# Patient Record
Sex: FEMALE | Race: BLACK OR AFRICAN AMERICAN | NOT HISPANIC OR LATINO | Employment: FULL TIME | URBAN - METROPOLITAN AREA
[De-identification: names, ages, dates, MRNs, and addresses within clinical notes are randomized per-mention and may not be internally consistent; named-entity substitution may affect disease eponyms.]

---

## 2017-01-03 ENCOUNTER — RADIANT APPOINTMENT (OUTPATIENT)
Dept: ULTRASOUND IMAGING | Facility: CLINIC | Age: 42
End: 2017-01-03
Attending: NURSE PRACTITIONER
Payer: COMMERCIAL

## 2017-01-03 ENCOUNTER — OFFICE VISIT (OUTPATIENT)
Dept: OBGYN | Facility: CLINIC | Age: 42
End: 2017-01-03
Payer: COMMERCIAL

## 2017-01-03 VITALS
WEIGHT: 161.4 LBS | SYSTOLIC BLOOD PRESSURE: 142 MMHG | HEART RATE: 78 BPM | BODY MASS INDEX: 27.55 KG/M2 | TEMPERATURE: 99.1 F | HEIGHT: 64 IN | DIASTOLIC BLOOD PRESSURE: 89 MMHG

## 2017-01-03 DIAGNOSIS — R10.2 PELVIC PAIN IN FEMALE: ICD-10-CM

## 2017-01-03 DIAGNOSIS — D25.9 UTERINE LEIOMYOMA, UNSPECIFIED LOCATION: Primary | ICD-10-CM

## 2017-01-03 DIAGNOSIS — D25.9 UTERINE LEIOMYOMA, UNSPECIFIED LOCATION: ICD-10-CM

## 2017-01-03 PROCEDURE — 76856 US EXAM PELVIC COMPLETE: CPT

## 2017-01-03 PROCEDURE — 99214 OFFICE O/P EST MOD 30 MIN: CPT | Performed by: NURSE PRACTITIONER

## 2017-01-03 ASSESSMENT — PAIN SCALES - GENERAL: PAINLEVEL: MODERATE PAIN (4)

## 2017-01-03 NOTE — PROGRESS NOTES
S: Cyndy is a 41 year old  3 para 2 presenting today with concerns of right sided pelvic pain. Symptoms began approximately 5 days ago. Patient does have a history of multiple uterine fibroids, the largest being located on her right side and is not quite 6 cm. This was discovered approximately 6 months ago. Dr. Agbeh placed her on Lupron for management. Patient did one injection, decided not to do the second due to side effects that were undesirable. Pain comes and goes and can go down her right leg. Describes as cramping and like contractions. NSAIDS and raspberry leaf tea both seem to help. No aggravating factors. Symptoms also improve with rest. No bowel changes, appetite normal. Denies abnormal urinary symptoms, vaginal symptoms, fevers, nausea, dizziness. Patient not sure if she is done with childbearing yet, so not interested in hysterectomy. Patient medical, surgical, social, and family history reviewed and updated at today's visit. ROS: 10 point ROS neg other than the symptoms noted above in the HPI.    O: This is a well appearing female in no acute distress. Answers questions and maintains eye contact appropriately. Vital signs noted.  CV: Regular rate and rhythm without murmur, gallop, rub  RESPIRATORY: Clear to auscultation bilaterally.  ABDOMEN: Soft, nondistended, normoactive bowel sounds. No hepatosplenomegaly. No guarding, rebounding, or rigidity. Tender to palpation across lower quadrants bilaterally. Fundal height approximately 16 cm.  Vulva: No external lesions, normal hair distribution, no adenopathy  BUS:  Normal, no masses noted  Vagina: Moist, pink, no abnormal discharge, well rugated, no lesions  Cervix: Pink, parous, midline. Without cervical motion tenderness.  Uterus: Enlarged to approximately 16 weeks gestation, tender, mobile  Ovaries: Not palpable.    A/P:  (D25.9) Uterine leiomyoma, unspecified location  (primary encounter diagnosis)  Comment: Discussed patient's history of  multiple uterine fibroids, enlarged uterus. Symptoms may be related to this, but cannot rule out other pathology such as ovarian cyst. Plan pelvic ultrasound as her last was approximately 6 months ago and did one dose of Lupron afterwards. For now, continue NSAIDs. Use heat PRN. Warning signs to monitor for and report immediately discussed with patient and she verbalizes understanding. We did discuss management options of her uterine fibroids. Does not want hysterectomy and is unsure about future childbearing. We discussed myomectomy, UAE, repeat Lupron, hormonal contraception. Will follow up with patient once results available. Patient is given an opportunity to ask questions and have them answered.  Plan: US Pelvic Complete with Transvaginal         (R10.2) Pelvic pain in female  Comment: Per above  Plan: US Pelvic Complete with Transvaginal  Approximately 30 minutes face to face time was spent with the patient today with greater than 50% spent in education and counseling regarding fibroid history and management.         Jaye ALATORRE CNP

## 2017-01-03 NOTE — MR AVS SNAPSHOT
"              After Visit Summary   1/3/2017    Cyndy Alfred    MRN: [id number]           Patient Information     Date Of Birth          1975        Visit Information        Provider Department      1/3/2017 8:30 AM Jaye Botello APRN CNP [facility]        Today's Diagnoses     Uterine leiomyoma, unspecified location    -  1     Pelvic pain in female            Follow-ups after your visit        Future tests that were ordered for you today     Open Future Orders        Priority Expected Expires Ordered    US Pelvic Complete with Transvaginal Routine  4/3/2017 1/3/2017            Who to contact     If you have questions or need follow up information about today's clinic visit or your schedule please contact [facility] directly at [phone].  Normal or non-critical lab and imaging results will be communicated to you by Walltikhart, letter or phone within 4 business days after the clinic has received the results. If you do not hear from us within 7 days, please contact the clinic through Walltikhart or phone. If you have a critical or abnormal lab result, we will notify you by phone as soon as possible.  Submit refill requests through [employer] or call your pharmacy and they will forward the refill request to us. Please allow 3 business days for your refill to be completed.          Additional Information About Your Visit        Wall[employer] Information     [employer] lets you send messages to your doctor, view your test results, renew your prescriptions, schedule appointments and more. To sign up, go to [email or web address] . Click on \"Log in\" on the left side of the screen, which will take you to the Welcome page. Then click on \"Sign up Now\" on the right side of the page.     You will be asked to enter the access code listed below, as well as some personal information. Please follow the directions to create your username and password.     Your access code is: [email or web address]  Expires: " "3/30/2017  1:43 PM     Your access code will  in 90 days. If you need help or a new code, please call your [place] clinic or [phone].        Your Vitals Were     Pulse Temperature Height BMI (Body Mass Index) Last Period       78 99.1  F (37.3  C) (Oral) 5' 4\" (1.626 m) 27.69 kg/m2 2016 (Exact Date)        Blood Pressure from Last 3 Encounters:   17 142/89   16 136/90   07/21/15 143/81    Weight from Last 3 Encounters:   17 161 lb 6.4 oz (73.211 kg)   16 165 lb 6.4 oz (75.025 kg)   07/21/15 156 lb (70.761 kg)                 Today's Medication Changes          These changes are accurate as of: 1/3/17  9:05 AM.  If you have any questions, ask your nurse or doctor.               Stop taking these medicines if you haven't already. Please contact your care team if you have questions.     leuprolide 11.25 MG kit   Commonly known as:  LUPRON DEPOT   Stopped by:  Jaye Botello APRN CNP                    Primary Care Provider Office Phone # Fax #    Xgpniz Mawuena Agbeh, [phone]       [facility] [address] [facility] [address]        Thank you!     [facility] you [facility]. Our goal is always to provide you with excellent care. Hearing back from our patients is one way we can continue to improve our services. Please take a few minutes to complete the written survey that you may receive in the mail after your visit with us. Thank you!             Your Updated Medication List - Protect others around you: Learn how to safely use, store and throw away your medicines at www.disposemymeds.org.          This list is accurate as of: 1/3/17  9:05 AM.  Always use your most recent med list.                   Brand Name Dispense Instructions for use    * MIDOL CRAMP FORMULA MAX ST PO      Take by mouth every 4 hours as needed for moderate pain       * ibuprofen 600 MG tablet    ADVIL/MOTRIN    28 tablet "    Take 1 tablet (600 mg) by mouth every 6 hours as needed for moderate pain       * UNABLE TO FIND      daily MEDICATION NAME: Green Tea Supplement       * UNABLE TO FIND      daily MEDICATION NAME: Tumeric supplement       * Notice:  This list has 4 medication(s) that are the same as other medications prescribed for you. Read the directions carefully, and ask your doctor or other care provider to review them with you.

## 2017-01-03 NOTE — NURSING NOTE
"Chief Complaint   Patient presents with     Pelvic Pain     RLQ pain x 5 days       Initial /89 mmHg  Pulse 78  Temp(Src) 99.1  F (37.3  C) (Oral)  Ht 5' 4\" (1.626 m)  Wt 161 lb 6.4 oz (73.211 kg)  BMI 27.69 kg/m2  LMP 12/16/2016 (Exact Date) Estimated body mass index is 27.69 kg/(m^2) as calculated from the following:    Height as of this encounter: 5' 4\" (1.626 m).    Weight as of this encounter: 161 lb 6.4 oz (73.211 kg)..  BP completed using cuff size: calos Potts CMA      "

## 2017-01-04 ENCOUNTER — TELEPHONE (OUTPATIENT)
Dept: OBGYN | Facility: CLINIC | Age: 42
End: 2017-01-04

## 2017-01-04 NOTE — TELEPHONE ENCOUNTER
Telephone call to patient and discussed ultrasound results. Uterus still enlarged, multiple fibroids still present. Also, discussed the right ovarian cyst and management. Symptoms have improved a little since it began. At this time, she wants to see if the symptoms improve over the next few weeks. If it does not or if it worsens, will follow up. She is also considering surgical intervention for her fibroids-would want to wait for Dr. Agbeh to be back in clinic before doing consult. Warning signs to monitor for and report immediately discussed with patient and she verbalizes understanding. Questions answered. Jaye ALATORRE CNP

## 2017-09-26 ENCOUNTER — OFFICE VISIT (OUTPATIENT)
Dept: OBGYN | Facility: CLINIC | Age: 42
End: 2017-09-26
Payer: COMMERCIAL

## 2017-09-26 ENCOUNTER — RADIANT APPOINTMENT (OUTPATIENT)
Dept: MRI IMAGING | Facility: CLINIC | Age: 42
End: 2017-09-26
Attending: OBSTETRICS & GYNECOLOGY
Payer: COMMERCIAL

## 2017-09-26 VITALS
OXYGEN SATURATION: 100 % | TEMPERATURE: 98 F | SYSTOLIC BLOOD PRESSURE: 119 MMHG | DIASTOLIC BLOOD PRESSURE: 78 MMHG | WEIGHT: 154.3 LBS | HEART RATE: 80 BPM | BODY MASS INDEX: 26.49 KG/M2

## 2017-09-26 DIAGNOSIS — D25.1 INTRAMURAL LEIOMYOMA OF UTERUS: ICD-10-CM

## 2017-09-26 DIAGNOSIS — R10.2 PELVIC PAIN IN FEMALE: Primary | ICD-10-CM

## 2017-09-26 DIAGNOSIS — R10.2 PELVIC PAIN IN FEMALE: ICD-10-CM

## 2017-09-26 PROCEDURE — 99214 OFFICE O/P EST MOD 30 MIN: CPT | Performed by: OBSTETRICS & GYNECOLOGY

## 2017-09-26 PROCEDURE — 72197 MRI PELVIS W/O & W/DYE: CPT | Performed by: RADIOLOGY

## 2017-09-26 PROCEDURE — A9585 GADOBUTROL INJECTION: HCPCS | Mod: JW | Performed by: RADIOLOGY

## 2017-09-26 RX ORDER — GADOBUTROL 604.72 MG/ML
7 INJECTION INTRAVENOUS ONCE
Status: COMPLETED | OUTPATIENT
Start: 2017-09-26 | End: 2017-09-26

## 2017-09-26 RX ADMIN — GADOBUTROL 7 ML: 604.72 INJECTION INTRAVENOUS at 17:02

## 2017-09-26 NOTE — PROGRESS NOTES
Cyndy is a 42 year old  referred here by self for consultation regarding symptomatic uterine fibroids.  She is complaining of worsening pelvic pain, back pains and heavier menses.  She has been treated in the past with OCP and lupron x one cycle. Quit lupron due to side effects.  She is wants treatment that preserves her uterus for possible future pregnancy.Not actively trying to get pregnant now  Requesting blood work.  She was in UC in 2017 with the [facility] ultrasound:  Results for orders placed or performed in visit on 17   US Pelvis Complete without Transvaginal    Narrative    PELVIC ULTRASOUND TRANSABDOMINAL IMAGING  January 3, 2017 10:23 AM    HISTORY: Pelvic pain and uterine leiomyoma.    COMPARISON: An ultrasound on 2015.    TECHNIQUE: Transabdominal imaging was performed.     FINDING: The uterus measures 16.1 x 9.7 x 10.9 cm. Again seen are  numerous diffuse uterine myomas, with at least 10 identifiable. The  largest five are as follows:  1. In the right aspect of the body is a 5.7 x 4.5 x 4.4 cm intramural  myoma, previously measuring 5.7 x 4.8 x 4.1 cm.  2. In the posterior aspect of the lower uterine body, there is a 5.5 x  4.5 x 5.1 cm intramural myoma, previously measuring 5.1 x 5.7 x 4.5  cm.  3. In the anterior aspect of the mid body is a 3.0 x 2.7 x 2.7 cm  intramural myoma, previously measuring 3.2 x 3.1 x 2.9 cm.  4. In the anterior aspect of the uterine fundus is a 3.8 x 1.9 x 2.5  cm intramural myoma. Previously this measured 3.1 x 3.2 x 2.4 cm.  5. In the right aspect of the lower uterine segment is a 4.2 x 3.3 x  4.4 cm intramural myoma. This was not measured previously.    There are several additional smaller myomas which were not measured.    The endometrium is normal measuring 0.6 cm. The right ovary contains a  2.5 x 1.2 x 1.7 cm somewhat complex area with no internal blood flow.  This may represent a complex cyst. The left ovary is normal. Normal  blood flow is seen in  both ovaries. No adnexal pathology is seen.  There is no free fluid in the cul-de-sac.      Impression    IMPRESSION:   1. Multiple uterine myomas as described above.  2. There is a 2.5 cm complex area of the right ovary. This most likely  represents a complex cyst. This can be followed in one to two months  by ultrasound.    MARIS GILLESPIE MD         ROS: Ten point review of systems was reviewed and negative except the above.    Gyne: - abn pap (last pap ), - STD's    Past Medical History:   Diagnosis Date     Headache(784.0)      History of cardiac murmur childhood     PIH (Pregnancy Induced Hypertension), Previous Postpartum Condition      Uterine fibroid      Past Surgical History:   Procedure Laterality Date     C/SECTION, LOW TRANSVERSE      x2     Patient Active Problem List   Diagnosis     CARDIOVASCULAR SCREENING; LDL GOAL LESS THAN 160     Abnormal TSH     Pelvic pain in female       ALL/Meds: Her medication and allergy histories were reviewed and are documented in their appropriate chart areas.    SH: - tob, - EtOH,     FH: Her family history was reviewed and documented in its appropriate chart area.    PE: /78  Pulse 80  Temp 98  F (36.7  C) (Oral)  Wt 70 kg (154 lb 4.8 oz)  LMP 09/20/2017  SpO2 100%  BMI 26.49 kg/m2  Body mass index is 26.49 kg/(m^2).      General:  WNWD female, NAD  Alert  Oriented x 3  Gait:  Normal  Skin:  Normal skin turgor  HEENT:  NC/AT, EOMI  Abdomen: Uterus fundus to umbilicus..  Pelvic exam:  Not performed  Extremities:  No clubbing, no cyanosis and no edema.      A/P    ICD-10-CM    1. Pelvic pain in female R10.2 MR Pelvis w/o & w Contrast     CBC with platelets differential     Comprehensive metabolic panel     Lipid panel reflex to direct LDL     TSH with free T4 reflex     Hemoglobin A1c   2. Intramural leiomyoma of uterus D25.1 MR Pelvis w/o & w Contrast     CBC with platelets differential     Comprehensive metabolic panel     Lipid panel reflex to direct  LDL     TSH with free T4 reflex     Hemoglobin A1c       I discussed fibroids.  We discussed their origin, the fact that while they are benign, they do tend to grow slowly in response to estrogen.  We discussed the normal resolution that gradually occurs after menopause.  I explained that fibroids are very common and in many cases do not cause symptoms.  We discussed these symptoms, including menorrhagia, metrorrhagia, dysmenorrhea as well as the mass effect that fibroids can cause.  We discussed options for treatment.  These include conservative observation, symptomatic hormonal control, myomectomy, uterine artery embolization, and hysterectomy as a final resort..  We discussed the RISKS, BENEFITS, AND ALTERNATIVES of each of these options.  This includes the risk of post-procedure pain, passage of a necrosed fibroid and the need for post embolization hysterectomy.     She will await MRI results to decide how myomectomy may affect future fertility.  Discussed poosble HSG in the future.    25 minutes was spent face to face with the patient today discussing her history, diagnosis, and follow-up plan as noted above.  Over 50% of the visit was spent in counseling and coordination of care.    Total Visit Time: 30 minutes.    CEPHAS AGBEH, MD.

## 2017-09-26 NOTE — NURSING NOTE
"Chief Complaint   Patient presents with     Consult     Fibroids       Initial /78  Pulse 80  Temp 98  F (36.7  C) (Oral)  Wt 70 kg (154 lb 4.8 oz)  LMP 09/20/2017  SpO2 100%  BMI 26.49 kg/m2 Estimated body mass index is 26.49 kg/(m^2) as calculated from the following:    Height as of 1/3/17: 1.626 m (5' 4\").    Weight as of this encounter: 70 kg (154 lb 4.8 oz).  Medication Reconciliation: complete   Ernestine Veloz CMA      "

## 2017-09-26 NOTE — MR AVS SNAPSHOT
After Visit Summary   9/26/2017    Cyndy Alfred    MRN: [id number]           Patient Information     Date Of Birth          1975        Visit Information        Provider Department      9/26/2017 2:00 PM Agbeh, Cephas Mawuena, MD [facility]        Today's Diagnoses     Pelvic pain in female    -  1    Intramural leiomyoma of uterus          Care Instructions                                                           If you have any questions regarding your visit, Please contact your care team.    Women s Health CLINIC HOURS TELEPHONE NUMBER   Cephas Agbeh, M.D.    Mihaela Graham- LUCY Lucero - RN    [place] -         Monday-[facility]  8:00 am - 5 pm  Tuesday- [facility]  8:00am- 5 pm  Wednesday- Off  Thursday- [facility]  8:00 am- 5 pm  Friday-Jay  8:00 am 5 pm [facility]  [address] [place] [address]  [phone] ask for Women's [facility]    Imaging [phone]    [facility]  [address] [place] [address]  [phone]  Imaging [phone]     Urgent Care locations:    [facility] Saturday and Sunday   9 am - 5 pm    Monday-Friday   12 pm - 8 pm  Saturday and Sunday   9 am - 5 pm   [phone]       If you need a medication refill, please contact your pharmacy. Please allow 3 business days for your refill to be completed.  As always, Thank you for trusting us with your healthcare needs!                  Follow-ups after your visit        Future tests that were ordered for you today     Open Standing Orders        Priority Remaining Interval Expires Ordered    CBC with platelets differential Routine 1/1  10/26/2017 9/26/2017    Comprehensive metabolic panel Routine 1/1  10/26/2017 9/26/2017    Lipid panel reflex to direct LDL Routine 1/1  10/26/2017 9/26/2017    TSH with free T4 reflex Routine 1/1  10/26/2017 9/26/2017    Hemoglobin  A1c Routine 1/1  10/26/2017 9/26/2017          Open Future Orders        Priority Expected Expires Ordered    MR Pelvis w/o & w Contrast Routine  9/26/2018 9/26/2017            Who to contact     If you have questions or need follow up information about today's clinic visit or your schedule please contact [facility] directly at [phone].  Normal or non-critical lab and imaging results will be communicated to you by MyChart, letter or phone within 4 business days after the clinic has received the results. If you do not hear from us within 7 days, please contact the clinic through [employer] or phone. If you have a critical or abnormal lab result, we will notify you by phone as soon as possible.  Submit refill requests through [employer] or call your pharmacy and they will forward the refill request to us. Please allow 3 business days for your refill to be completed.          Additional Information About Your Visit        Biohe[employer]art Information     [employer] gives you secure access to your electronic health record. If you see a primary care provider, you can also send messages to your care team and make appointments. If you have questions, please call your primary care clinic.  If you do not have a primary care provider, please call [phone] and they will assist you.        Care EveryWhere ID     This is your Care EveryWhere ID. This could be used by other organizations to access your [facility] medical records  [id number]        Your Vitals Were     Pulse Temperature Last Period Pulse Oximetry BMI (Body Mass Index)       80 98  F (36.7  C) (Oral) 09/20/2017 100% 26.49 kg/m2        Blood Pressure from Last 3 Encounters:   09/26/17 119/78   01/03/17 142/89   12/30/16 136/90    Weight from Last 3 Encounters:   09/26/17 70 kg (154 lb 4.8 oz)   01/03/17 73.2 kg (161 lb 6.4 oz)   12/30/16 75 kg (165 lb 6.4 oz)               Primary Care Provider Office Phone # Fax #    Sparkle Mawuena Agbeh, [phone]  [phone]       [address] [place] [address]        Equal Access to Services     BEN LAMAR : Hadii naren ku hadhetalo Soadrianali, waaxda luqadaha, qaybta kaalmada ademirta, sarah choudeonte sommer. So [facility] [phone].    ATENCIÓN: Si habla español, tiene a pratt disposición servicios gratuitos de asistencia lingüística. Sheame al [phone].    We comply with applicable federal civil rights laws and [place] laws. We do not discriminate on the basis of race, color, national origin, age, disability sex, sexual orientation or gender identity.            Thank you!     Thank you for choosing [facility]  for your care. Our goal is always to provide you with excellent care. Hearing back from our patients is one way we can continue to improve our services. Please take a few minutes to complete the written survey that you may receive in the mail after your visit with us. Thank you!             Your Updated Medication List - Protect others around you: Learn how to safely use, store and throw away your medicines at www.disposemymeds.org.          This list is accurate as of: 9/26/17  2:39 PM.  Always use your most recent med list.                   Brand Name Dispense Instructions for use Diagnosis    MIDOL CRAMP FORMULA MAX ST PO      Take by mouth every 4 hours as needed for moderate pain        * UNABLE TO FIND      daily MEDICATION NAME: Green Tea Supplement        * UNABLE TO FIND      daily MEDICATION NAME: Tumeric supplement        * Notice:  This list has 2 medication(s) that are the same as other medications prescribed for you. Read the directions carefully, and ask your doctor or other care provider to review them with you.

## 2017-10-03 ENCOUNTER — OFFICE VISIT (OUTPATIENT)
Dept: OBGYN | Facility: CLINIC | Age: 42
End: 2017-10-03
Payer: COMMERCIAL

## 2017-10-03 VITALS
TEMPERATURE: 98.3 F | WEIGHT: 157 LBS | SYSTOLIC BLOOD PRESSURE: 121 MMHG | BODY MASS INDEX: 26.95 KG/M2 | HEART RATE: 82 BPM | DIASTOLIC BLOOD PRESSURE: 79 MMHG | OXYGEN SATURATION: 100 %

## 2017-10-03 DIAGNOSIS — R10.2 PELVIC PAIN IN FEMALE: ICD-10-CM

## 2017-10-03 DIAGNOSIS — N85.2 ENLARGED UTERUS: ICD-10-CM

## 2017-10-03 DIAGNOSIS — D25.1 INTRAMURAL AND SUBMUCOUS LEIOMYOMA OF UTERUS: Primary | ICD-10-CM

## 2017-10-03 DIAGNOSIS — D25.0 INTRAMURAL AND SUBMUCOUS LEIOMYOMA OF UTERUS: Primary | ICD-10-CM

## 2017-10-03 DIAGNOSIS — D25.1 INTRAMURAL LEIOMYOMA OF UTERUS: ICD-10-CM

## 2017-10-03 DIAGNOSIS — D50.0 IRON DEFICIENCY ANEMIA DUE TO CHRONIC BLOOD LOSS: ICD-10-CM

## 2017-10-03 LAB
ALBUMIN SERPL-MCNC: 3.5 G/DL (ref 3.4–5)
ALP SERPL-CCNC: 51 U/L (ref 40–150)
ALT SERPL W P-5'-P-CCNC: 24 U/L (ref 0–50)
ANION GAP SERPL CALCULATED.3IONS-SCNC: 6 MMOL/L (ref 3–14)
AST SERPL W P-5'-P-CCNC: 16 U/L (ref 0–45)
BASOPHILS # BLD AUTO: 0 10E9/L (ref 0–0.2)
BASOPHILS NFR BLD AUTO: 0.2 %
BILIRUB SERPL-MCNC: 0.3 MG/DL (ref 0.2–1.3)
BUN SERPL-MCNC: 9 MG/DL (ref 7–30)
CALCIUM SERPL-MCNC: 8.8 MG/DL (ref 8.5–10.1)
CHLORIDE SERPL-SCNC: 104 MMOL/L (ref 94–109)
CHOLEST SERPL-MCNC: 178 MG/DL
CO2 SERPL-SCNC: 27 MMOL/L (ref 20–32)
CREAT SERPL-MCNC: 0.97 MG/DL (ref 0.52–1.04)
DIFFERENTIAL METHOD BLD: ABNORMAL
EOSINOPHIL # BLD AUTO: 0.1 10E9/L (ref 0–0.7)
EOSINOPHIL NFR BLD AUTO: 1.1 %
ERYTHROCYTE [DISTWIDTH] IN BLOOD BY AUTOMATED COUNT: 17.4 % (ref 10–15)
GFR SERPL CREATININE-BSD FRML MDRD: 63 ML/MIN/1.7M2
GLUCOSE SERPL-MCNC: 94 MG/DL (ref 70–99)
HBA1C MFR BLD: 5.7 % (ref 4.3–6)
HCT VFR BLD AUTO: 31.6 % (ref 35–47)
HDLC SERPL-MCNC: 90 MG/DL
HGB BLD-MCNC: 9.5 G/DL (ref 11.7–15.7)
LDLC SERPL CALC-MCNC: 77 MG/DL
LYMPHOCYTES # BLD AUTO: 1.8 10E9/L (ref 0.8–5.3)
LYMPHOCYTES NFR BLD AUTO: 40.6 %
MCH RBC QN AUTO: 20.7 PG (ref 26.5–33)
MCHC RBC AUTO-ENTMCNC: 30.1 G/DL (ref 31.5–36.5)
MCV RBC AUTO: 69 FL (ref 78–100)
MONOCYTES # BLD AUTO: 0.3 10E9/L (ref 0–1.3)
MONOCYTES NFR BLD AUTO: 6.5 %
NEUTROPHILS # BLD AUTO: 2.3 10E9/L (ref 1.6–8.3)
NEUTROPHILS NFR BLD AUTO: 51.6 %
NONHDLC SERPL-MCNC: 88 MG/DL
PLATELET # BLD AUTO: 289 10E9/L (ref 150–450)
POTASSIUM SERPL-SCNC: 3.6 MMOL/L (ref 3.4–5.3)
PROT SERPL-MCNC: 7.7 G/DL (ref 6.8–8.8)
RBC # BLD AUTO: 4.6 10E12/L (ref 3.8–5.2)
SODIUM SERPL-SCNC: 137 MMOL/L (ref 133–144)
TRIGL SERPL-MCNC: 54 MG/DL
TSH SERPL DL<=0.005 MIU/L-ACNC: 1.47 MU/L (ref 0.4–4)
WBC # BLD AUTO: 4.4 10E9/L (ref 4–11)

## 2017-10-03 PROCEDURE — 36415 COLL VENOUS BLD VENIPUNCTURE: CPT | Performed by: OBSTETRICS & GYNECOLOGY

## 2017-10-03 PROCEDURE — 83036 HEMOGLOBIN GLYCOSYLATED A1C: CPT | Performed by: OBSTETRICS & GYNECOLOGY

## 2017-10-03 PROCEDURE — 80050 GENERAL HEALTH PANEL: CPT | Performed by: OBSTETRICS & GYNECOLOGY

## 2017-10-03 PROCEDURE — 80061 LIPID PANEL: CPT | Performed by: OBSTETRICS & GYNECOLOGY

## 2017-10-03 PROCEDURE — 99214 OFFICE O/P EST MOD 30 MIN: CPT | Performed by: OBSTETRICS & GYNECOLOGY

## 2017-10-03 NOTE — PATIENT INSTRUCTIONS
If you have any questions regarding your visit, Please contact your care team.    Women s Health CLINIC HOURS TELEPHONE NUMBER   Sparkles Agbeh, M.D.    Mihaela Graham- LUCY Lucero - LUCY Temple -         Monday-[facility]  8:00 am - 5 pm  Tuesday- [facility]  8:00am- 5 pm  Wednesday- Off  Thursday- [facility]  8:00 am- 5 pm  Friday-[place]  8:00 am 5 pm [facility]  [address] [place] [address]  [phone] ask for Women's [facility]    Imaging [phone]    [facility]  [address]  [place] [address] [phone]  Imaging [phone]     Urgent Care locations:    [facility] Saturday and Sunday   9 am - 5 pm    Monday-Friday   12 pm - 8 pm  Saturday and Sunday   9 am - 5 pm   [phone]       If you need a medication refill, please contact your pharmacy. Please allow 3 business days for your refill to be completed.  As always, Thank you for trusting us with your healthcare needs!

## 2017-10-03 NOTE — PROGRESS NOTES
Cyndy is a 42 year old  referred here by self for consultation regarding follow up of MRI as bellow and labs.  Results for orders placed or performed in visit on 10/03/17   CBC with platelets differential   Result Value Ref Range    WBC 4.4 4.0 - 11.0 10e9/L    RBC Count 4.60 3.8 - 5.2 10e12/L    Hemoglobin 9.5 (L) 11.7 - 15.7 g/dL    Hematocrit 31.6 (L) 35.0 - 47.0 %    MCV 69 (L) 78 - 100 fl    MCH 20.7 (L) 26.5 - 33.0 pg    MCHC 30.1 (L) 31.5 - 36.5 g/dL    RDW 17.4 (H) 10.0 - 15.0 %    Platelet Count 289 150 - 450 10e9/L    Diff Method Automated Method     % Neutrophils 51.6 %    % Lymphocytes 40.6 %    % Monocytes 6.5 %    % Eosinophils 1.1 %    % Basophils 0.2 %    Absolute Neutrophil 2.3 1.6 - 8.3 10e9/L    Absolute Lymphocytes 1.8 0.8 - 5.3 10e9/L    Absolute Monocytes 0.3 0.0 - 1.3 10e9/L    Absolute Eosinophils 0.1 0.0 - 0.7 10e9/L    Absolute Basophils 0.0 0.0 - 0.2 10e9/L   Comprehensive metabolic panel   Result Value Ref Range    Sodium 137 133 - 144 mmol/L    Potassium 3.6 3.4 - 5.3 mmol/L    Chloride 104 94 - 109 mmol/L    Carbon Dioxide 27 20 - 32 mmol/L    Anion Gap 6 3 - 14 mmol/L    Glucose 94 70 - 99 mg/dL    Urea Nitrogen 9 7 - 30 mg/dL    Creatinine 0.97 0.52 - 1.04 mg/dL    GFR Estimate 63 >60 mL/min/1.7m2    GFR Estimate If Black 77 >60 mL/min/1.7m2    Calcium 8.8 8.5 - 10.1 mg/dL    Bilirubin Total 0.3 0.2 - 1.3 mg/dL    Albumin 3.5 3.4 - 5.0 g/dL    Protein Total 7.7 6.8 - 8.8 g/dL    Alkaline Phosphatase 51 40 - 150 U/L    ALT 24 0 - 50 U/L    AST 16 0 - 45 U/L   Lipid panel reflex to direct LDL   Result Value Ref Range    Cholesterol 178 <200 mg/dL    Triglycerides 54 <150 mg/dL    HDL Cholesterol 90 >49 mg/dL    LDL Cholesterol Calculated 77 <100 mg/dL    Non HDL Cholesterol 88 <130 mg/dL   TSH with free T4 reflex   Result Value Ref Range    TSH 1.47 0.40 - 4.00 mU/L   Hemoglobin A1c   Result Value Ref Range    Hemoglobin A1C 5.7 4.3 - 6.0 %     .EXAMINATION: MR PELVIS W/O & W  CONTRAST, 9/26/2017 5:08 PM     COMPARISON: Pelvic ultrasound 1/3/2017.     HISTORY: pelvic pain, Intramural leiomyoma of uterus, Pelvic and  perineal pain     TECHNIQUE: Multiplanar, multisequence imaging was obtained of the  pelvis without and with intravenous contrast. Contrast dose: 7 ml  gadavist     FINDINGS:      Uterus is enlarged by innumerable heterogeneously enhancing myometrial  masses, measuring approximately 11.6 x 9.7 x 16.9 cm. Elongated  cervix.  Uterus is anteverted and retroflexed. The junctional zone  obscured by innumerable masses, poorly defined on the current  examination.     Innumerable fibroids predominantly intramural, however some of the  masses exhibit a submucosal component, distorting the endometrial  contour. A few lesions show subserosal components, though none of the  lesions exhibit a pedunculated morphology. The masses demonstrate  hypointense T1, hypointense T2. At least two of the masses demonstrate  peripheral rim calcifications as previously seen on prior ultrasound  characterized by T1 and T2 hypointense signal.      The largest fibroid measures 4.5 x 5.6 cm, intramural with peripheral  calcification, arising from the posterior midline uterine body, series  8 image 32. Prominent 5.5 x 5.3 cm intramural mass is located in the  right uterine body (series 8, image 17). There is a significance  submucosal component which exerts mass effect on endometrial canal  with associated distortion and leftward displacement. Additional  masses with significant submucosal components can be seen at the  uterine fundus (series 8, image 19 and series 8, image 20 among  others).     Decompressed urinary bladder; associated mass effect relating to above  myomatous uterus. Small amount of free fluid in the pelvis,  potentially physiologic. No pelvic lymphadenopathy. No suspicious  lesions in the bones. The ovaries are displaced superiorly with a  follicular appearance, left ovary, series 3 image  7-9 and right ovary  series 3 images 7-11. No dilated loops of bowel.         IMPRESSION: Innumerable myometrial masses, several of which exhibit  submucosal components distorting the endometrial contour.     I have personally reviewed the examination and initial interpretation  and I agree with the findings.     CELIO NARANJO MD    ROS: Ten point review of systems was reviewed and negative except the above.    Gyne: - abn pap (last pap ), - STD's    Past Medical History:   Diagnosis Date     Headache(784.0)      History of cardiac murmur childhood     PIH (Pregnancy Induced Hypertension), Previous Postpartum Condition      Uterine fibroid      Past Surgical History:   Procedure Laterality Date     C/SECTION, LOW TRANSVERSE      x2     Patient Active Problem List   Diagnosis     CARDIOVASCULAR SCREENING; LDL GOAL LESS THAN 160     Abnormal TSH     Pelvic pain in female       ALL/Meds: Her medication and allergy histories were reviewed and are documented in their appropriate chart areas.    SH: - tob, - EtOH,     FH: Her family history was reviewed and documented in its appropriate chart area.    PE: /79  Pulse 82  Temp 98.3  F (36.8  C) (Oral)  Wt 71.2 kg (157 lb)  LMP 09/20/2017  SpO2 100%  BMI 26.95 kg/m2  Body mass index is 26.95 kg/(m^2).  General:  WNWD female, NAD  Alert  Oriented x 3  Gait:  Normal  Skin:  Normal skin turgor  HEENT:  NC/AT, EOMI  Abdomen:  Non-tender, non-distended.  Pelvic exam:  Not performed  Extremities:  No clubbing, no cyanosis and no edema.        A/P      ICD-10-CM    1. Intramural and submucous leiomyoma of uterus D25.1 VITAMIN D, CHOLECALCIFEROL, PO    D25.0 OB/GYN REFERRAL   2. Enlarged uterus N85.2 VITAMIN D, CHOLECALCIFEROL, PO     OB/GYN REFERRAL   3. Iron deficiency anemia due to chronic blood loss D50.0 VITAMIN D, CHOLECALCIFEROL, PO     OB/GYN REFERRAL       We discussed her multiple symptomatic uterine fibroids again.  Has failed medical  treatments.  Declines Uterine Artery Embolization.  Wants to avoid hysterectomy.  Leaning towards myomectomy.  Requestin referral To Dr. Dr. Jazmine FREY at [facility].  Scheduled on 10/31/17.    Advised OTC iron BID with stool softer for the anemia.  25 minutes was spent face to face with the patient today discussing her history, diagnosis, and follow-up plan as noted above.  Over 50% of the visit was spent in counseling and coordination of care.    Total Visit Time: 30 minutes.    CEPHAS AGBEH, MD.

## 2017-10-03 NOTE — MR AVS SNAPSHOT
After Visit Summary   10/3/2017    Cyndy Alfred    MRN: [id number]           Patient Information     Date Of Birth          1975        Visit Information        Provider Department      10/3/2017 1:00 PM Agbeh, Cephas Mawuena, MD [facility]        Today's Diagnoses     Intramural and submucous leiomyoma of uterus    -  1    Enlarged uterus        Iron deficiency anemia due to chronic blood loss          Care Instructions                                                           If you have any questions regarding your visit, Please contact your care team.    Women s Health CLINIC HOURS TELEPHONE NUMBER   Cephas Agbeh, M.D.    Mihaela Graham- LUCY Lucero - LUCY Temple -         Monday-[facility]  8:00 am - 5 pm  Tuesday- [facility]  8:00am- 5 pm  Wednesday- Off  Thursday- [facility]  8:00 am- 5 pm  Friday-[facility]  8:00 am 5 pm [facility]  [address]  [place], [place] [address] [phone] ask for [facility]    Imaging [phone]    [facility]  [address] [place] [id number] [phone]  Imaging [phone]     Urgent Care locations:    [facility] Saturday and Sunday   9 am - 5 pm    Monday-Friday   12 pm - 8 pm  Saturday and Sunday   9 am - 5 pm   [phone]       If you need a medication refill, please contact your pharmacy. Please allow 3 business days for your refill to be completed.  As always, Thank you for trusting us with your healthcare needs!                  Follow-ups after your visit        Additional Services     OB/GYN REFERRAL       Your provider has referred you to:  [facility]: [facility] [phone]   [email or web address]    Please be aware that coverage of these services is subject to the terms and limitations of your health insurance plan.   Call member services at your health plan with any benefit or coverage questions.      Please bring the following with you to your appointment:    (1) Any X-Rays, CTs or MRIs which have been performed.  Contact the facility where they were done to arrange for  prior to your scheduled appointment.   (2) List of current medications   (3) This referral request   (4) Any documents/labs given to you for this referral                  Your next 10 appointments already scheduled     Oct 31, 2017 10:00 AM CDT   Fibroid Visit with Jazmine Reardon MD   [facility] ([facility])    [facility] [facility] [address]  [address]  [facility] [address] [phone]              Who to contact     If you have questions or need follow up information about today's clinic visit or your schedule please contact [facility] directly at [phone].  Normal or non-critical lab and imaging results will be communicated to you by MyChart, letter or phone within 4 business days after the clinic has received the results. If you do not hear from us within 7 days, please contact the clinic through MyChart or phone. If you have a critical or abnormal lab result, we will notify you by phone as soon as possible.  Submit refill requests through [employer] or call your pharmacy and they will forward the refill request to us. Please allow 3 business days for your refill to be completed.          Additional Information About Your Visit        [employer] Information     [employer] gives you secure access to your electronic health record. If you see a primary care provider, you can also send messages to your care team and make appointments. If you have questions, please call your primary care clinic.  If you do not have a primary care provider, please call [phone] and they will assist you.        Care EveryWhere ID     This is your Care EveryWhere ID. This could be used by other  organizations to access your [facility] medical records  [id number]        Your Vitals Were     Pulse Temperature Last Period Pulse Oximetry BMI (Body Mass Index)       82 98.3  F (36.8  C) (Oral) 09/20/2017 100% 26.95 kg/m2        Blood Pressure from Last 3 Encounters:   10/03/17 121/79   09/26/17 119/78   01/03/17 142/89    Weight from Last 3 Encounters:   10/03/17 71.2 kg (157 lb)   09/26/17 70 kg (154 lb 4.8 oz)   01/03/17 73.2 kg (161 lb 6.4 oz)              We Performed the Following     OB/GYN REFERRAL        Primary Care Provider Office Phone # Fax #    Sparkle Mawuena Agbeh, [phone]       [address] [place] [address]        Equal Access to Services     : Hadii aad ku hadasho Soomaali, waaxda luqadaha, qaybta kaalmada adeegyada, waxay ayazin hayaan ross arambula . So [facility] [phone].    ATENCIÓN: Si habla español, tiene a pratt disposición servicios gratuitos de asistencia lingüística. Llame al [phone].    We comply with applicable federal civil rights laws and [place] laws. We do not discriminate on the basis of race, color, national origin, age, disability, sex, sexual orientation, or gender identity.            Thank you!     Thank you for choosing [facility]  for your care. Our goal is always to provide you with excellent care. Hearing back from our patients is one way we can continue to improve our services. Please take a few minutes to complete the written survey that you may receive in the mail after your visit with us. Thank you!             Your Updated Medication List - Protect others around you: Learn how to safely use, store and throw away your medicines at www.disposemymeds.org.          This list is accurate as of: 10/3/17  3:29 PM.  Always use your most recent med list.                   Brand Name Dispense Instructions for use Diagnosis    MIDOL CRAMP FORMULA MAX ST PO      Take by mouth every 4 hours as needed for  moderate pain        * UNABLE TO FIND      daily MEDICATION NAME: Green Tea Supplement        * UNABLE TO FIND      daily MEDICATION NAME: Tumeric supplement        VITAMIN D (CHOLECALCIFEROL) PO      Take 2,000 Units by mouth daily    Intramural and submucous leiomyoma of uterus, Enlarged uterus, Iron deficiency anemia due to chronic blood loss       * Notice:  This list has 2 medication(s) that are the same as other medications prescribed for you. Read the directions carefully, and ask your doctor or other care provider to review them with you.

## 2017-10-03 NOTE — NURSING NOTE
"Chief Complaint   Patient presents with     Consult     Fibroids       Initial /79  Pulse 82  Temp 98.3  F (36.8  C) (Oral)  Wt 71.2 kg (157 lb)  LMP 09/20/2017  SpO2 100%  BMI 26.95 kg/m2 Estimated body mass index is 26.95 kg/(m^2) as calculated from the following:    Height as of 1/3/17: 1.626 m (5' 4\").    Weight as of this encounter: 71.2 kg (157 lb).  Medication Reconciliation: complete   Ernestine Veloz CMA      "

## 2017-10-31 ENCOUNTER — OFFICE VISIT (OUTPATIENT)
Dept: OBGYN | Facility: CLINIC | Age: 42
End: 2017-10-31
Attending: OBSTETRICS & GYNECOLOGY
Payer: COMMERCIAL

## 2017-10-31 VITALS
BODY MASS INDEX: 27.96 KG/M2 | WEIGHT: 157.8 LBS | DIASTOLIC BLOOD PRESSURE: 92 MMHG | SYSTOLIC BLOOD PRESSURE: 148 MMHG | HEIGHT: 63 IN | HEART RATE: 68 BPM

## 2017-10-31 DIAGNOSIS — D25.0 INTRAMURAL, SUBMUCOUS, AND SUBSEROUS LEIOMYOMA OF UTERUS: Primary | ICD-10-CM

## 2017-10-31 DIAGNOSIS — D25.2 INTRAMURAL, SUBMUCOUS, AND SUBSEROUS LEIOMYOMA OF UTERUS: Primary | ICD-10-CM

## 2017-10-31 DIAGNOSIS — D50.0 IRON DEFICIENCY ANEMIA DUE TO CHRONIC BLOOD LOSS: ICD-10-CM

## 2017-10-31 DIAGNOSIS — D25.1 INTRAMURAL, SUBMUCOUS, AND SUBSEROUS LEIOMYOMA OF UTERUS: Primary | ICD-10-CM

## 2017-10-31 DIAGNOSIS — N92.0 MENORRHAGIA WITH REGULAR CYCLE: ICD-10-CM

## 2017-10-31 PROCEDURE — 99212 OFFICE O/P EST SF 10 MIN: CPT | Mod: ZF

## 2017-10-31 RX ORDER — NORGESTIMATE AND ETHINYL ESTRADIOL 7DAYSX3 28
1 KIT ORAL DAILY
Qty: 28 TABLET | Refills: 0 | Status: SHIPPED | OUTPATIENT
Start: 2017-10-31 | End: 2018-09-14

## 2017-10-31 RX ORDER — PHENAZOPYRIDINE HYDROCHLORIDE 100 MG/1
200 TABLET, FILM COATED ORAL ONCE
Status: CANCELLED | OUTPATIENT
Start: 2017-10-31 | End: 2017-10-31

## 2017-10-31 NOTE — LETTER
10/31/2017      RE: Cyndy Alfred  [address] [facility] [address]       CC:  Consult from Dr. Agbeh (PCP) for uterine fibroids    Subjective:  Ms. Alfred is a 42 year old  who presents for consultation of uterine fibroids.  She has a history of fibroids dating back ~10 years with symptoms of heavy menses that was briefly treated with OCP and lupron (1x cycle, stopped due to side effects).  Within the last month or so, she has been experiencing increasing back pain and bladder pressure/discomfort with nocturia and was seen by her PCP.  A pelvic MRI was done which showed multiple myometrial masses and an enlarged uterus compressing on her bladder and sacrum/coccyx.  Due to her new symptoms, Ms. Alfred decided that now is the time to explore treatment options for her fibroids.  She has done quite a bit of outside research regarding treatment options for fibroids, and is adamant against hysterectomy.  She is open to any other options.    Patient's last menstrual period was 10/20/2017.  Menses are regular q 28-30 days, lasting 4 days.  Heavy menses with the need to change pads every ~2 hours.    OB history:  She has had 2x C-sections (her children are 23 and 10 year old).  Her most recent pregnancy resulted in a 4 pound infant that was born at ~38 weeks.  It was noted at the time that her existing fibroids may have contributed to growth restriction of the infant.    Gyn history:  Last pap smear was normal on 7/7/15.    Last TSH: 1.47    PMH:  - Fe deficiency anemia, currently on oral supplementation BID  - negative screen for sickle cell and thalassemia in pregnancies.     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    Patients records are available and reviewed at today's visit.    Past Medical History:   Diagnosis Date     Headache(784.0)      History of cardiac murmur childhood     PIH (Pregnancy Induced Hypertension), Previous  "Postpartum Condition      Uterine fibroid        Past Surgical History:   Procedure Laterality Date     C/SECTION, LOW TRANSVERSE      x2       Family History   Problem Relation Age of Onset     EYE* Paternal Grandfather      WENT BLIND-? UNSURE WHY     DIABETES Father      DIABETES Mother      Hypertension Maternal Grandmother      Hypertension Maternal Grandfather        Allergies: No known allergies    Current Outpatient Prescriptions   Medication Sig Dispense Refill     Ferrous Sulfate (IRON SUPPLEMENT PO) Take 325 mg by mouth 2 times daily (with meals)       Docusate Sodium (COLACE PO) Take 50 mg by mouth 2 times daily       VITAMIN D, CHOLECALCIFEROL, PO Take 2,000 Units by mouth daily       UNABLE TO FIND daily MEDICATION NAME: Green Tea Supplement       UNABLE TO FIND daily MEDICATION NAME: Tumeric supplement       Ibuprofen (MIDOL CRAMP FORMULA MAX ST PO) Take by mouth every 4 hours as needed for moderate pain       ROS:  C: NEGATIVE for fever, chills, change in weight  I: NEGATIVE for worrisome rashes, moles or lesions  E: NEGATIVE for vision changes or irritation  E/M: NEGATIVE for ear, mouth and throat problems  R: NEGATIVE for significant cough or SOB  CV: NEGATIVE for chest pain, palpitations or peripheral edema  GI: NEGATIVE for nausea, heartburn, or change in bowel habits, states BM are painful to pass and difficult  : NEGATIVE for dysuria, hematuria, vaginal discharge.  Has nocturia, urinary frequency.  M: NEGATIVE for significant arthralgias or myalgia. ++ Low back pain.  N: NEGATIVE for weakness, dizziness or paresthesias  E: NEGATIVE for temperature intolerance, skin/hair changes  P: NEGATIVE for changes in mood or affect    EXAM:  Blood pressure (!) 148/92, pulse 68, height 1.6 m (5' 3\"), weight 71.6 kg (157 lb 12.8 oz), last menstrual period 10/20/2017, not currently breastfeeding.   BMI= Body mass index is 27.95 kg/(m^2).  General - pleasant female in no acute distress.  Abdomen - soft, " nontender, nondistended, no hepatosplenomegaly.  Enlarged uterus up to level of umbilicus, mobile, moderately tender.  Well healed low transverse incision  Pelvic - patient declines    ASSESSMENT/PLAN:  1.  Uterine fibroids:  MRI showed innumerable uterine fibroids compressing the bladder and sacrum/coccyx area.  Discussed extensively the treatment options including hysterectomy vs myomectomy vs embolization and hysteroscopic myomectomy.  Patient is adamant about preserving her uterus, though child bearing is not her highest priority at this time.   - Plan for myomectomy per patient preference, patient aware of significant risk of blood transfusion, risk of damage to nearby organs and possible lifesaving hysterectomy.  Will type and cross for blood day before surgery.  - Pre-op exam by PCP  - Start OCP, continue iron supplement.    2.  Chronic microcytic anemia: consistent with Fe deficiency due to chronic blood loss.  - Start OCP, ortho tri-cyclen has worked well in the past  - Continue Fe supplements    I, Soco Jiménez MS3, am acting as the scribe for Jazmine Reardon MD.  All aspects of the exam and documentation were approved by the attending physician.    Soco Jiménez, MS3    I agree with the PFSH and ROS as completed by the medical student, except for changes made by me.  The remainder of the encounter was performed by me and scribed by the medical student.  The scribed note accurately reflects my personal services and the decisions made by me.  Jazmine Reardon MD    Total visit time was 45 minutes with 45 minutes spent in counseling and coordination of care for fibroid uterus and menorrhagia with resulting anemia .  MD Jazmine Joe MD

## 2017-10-31 NOTE — PROGRESS NOTES
CC:  Consult from Dr. Agbeh (PCP) for uterine fibroids    Subjective:  Ms. Alfred is a 42 year old  who presents for consultation of uterine fibroids.  She has a history of fibroids dating back ~10 years with symptoms of heavy menses that was briefly treated with OCP and lupron (1x cycle, stopped due to side effects).  Within the last month or so, she has been experiencing increasing back pain and bladder pressure/discomfort with nocturia and was seen by her PCP.  A pelvic MRI was done which showed multiple myometrial masses and an enlarged uterus compressing on her bladder and sacrum/coccyx.  Due to her new symptoms, Ms. Alfred decided that now is the time to explore treatment options for her fibroids.  She has done quite a bit of outside research regarding treatment options for fibroids, and is adamant against hysterectomy.  She is open to any other options.    Patient's last menstrual period was 10/20/2017.  Menses are regular q 28-30 days, lasting 4 days.  Heavy menses with the need to change pads every ~2 hours.    OB history:  She has had 2x C-sections (her children are 23 and 10 year old).  Her most recent pregnancy resulted in a 4 pound infant that was born at ~38 weeks.  It was noted at the time that her existing fibroids may have contributed to growth restriction of the infant.    Gyn history:  Last pap smear was normal on 7/7/15.    Last TSH: 1.47    PMH:  - Fe deficiency anemia, currently on oral supplementation BID  - negative screen for sickle cell and thalassemia in pregnancies.     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    Patients records are available and reviewed at today's visit.    Past Medical History:   Diagnosis Date     Headache(784.0)      History of cardiac murmur childhood     PIH (Pregnancy Induced Hypertension), Previous Postpartum Condition      Uterine fibroid        Past Surgical History:   Procedure Laterality  "Date     C/SECTION, LOW TRANSVERSE      x2       Family History   Problem Relation Age of Onset     EYE* Paternal Grandfather      WENT BLIND-? UNSURE WHY     DIABETES Father      DIABETES Mother      Hypertension Maternal Grandmother      Hypertension Maternal Grandfather        Allergies: No known allergies    Current Outpatient Prescriptions   Medication Sig Dispense Refill     Ferrous Sulfate (IRON SUPPLEMENT PO) Take 325 mg by mouth 2 times daily (with meals)       Docusate Sodium (COLACE PO) Take 50 mg by mouth 2 times daily       VITAMIN D, CHOLECALCIFEROL, PO Take 2,000 Units by mouth daily       UNABLE TO FIND daily MEDICATION NAME: Green Tea Supplement       UNABLE TO FIND daily MEDICATION NAME: Tumeric supplement       Ibuprofen (MIDOL CRAMP FORMULA MAX ST PO) Take by mouth every 4 hours as needed for moderate pain       ROS:  C: NEGATIVE for fever, chills, change in weight  I: NEGATIVE for worrisome rashes, moles or lesions  E: NEGATIVE for vision changes or irritation  E/M: NEGATIVE for ear, mouth and throat problems  R: NEGATIVE for significant cough or SOB  CV: NEGATIVE for chest pain, palpitations or peripheral edema  GI: NEGATIVE for nausea, heartburn, or change in bowel habits, states BM are painful to pass and difficult  : NEGATIVE for dysuria, hematuria, vaginal discharge.  Has nocturia, urinary frequency.  M: NEGATIVE for significant arthralgias or myalgia. ++ Low back pain.  N: NEGATIVE for weakness, dizziness or paresthesias  E: NEGATIVE for temperature intolerance, skin/hair changes  P: NEGATIVE for changes in mood or affect    EXAM:  Blood pressure (!) 148/92, pulse 68, height 1.6 m (5' 3\"), weight 71.6 kg (157 lb 12.8 oz), last menstrual period 10/20/2017, not currently breastfeeding.   BMI= Body mass index is 27.95 kg/(m^2).  General - pleasant female in no acute distress.  Abdomen - soft, nontender, nondistended, no hepatosplenomegaly.  Enlarged uterus up to level of umbilicus, mobile, " moderately tender.  Well healed low transverse incision  Pelvic - patient declines    ASSESSMENT/PLAN:  1.  Uterine fibroids:  MRI showed innumerable uterine fibroids compressing the bladder and sacrum/coccyx area.  Discussed extensively the treatment options including hysterectomy vs myomectomy vs embolization and hysteroscopic myomectomy.  Patient is adamant about preserving her uterus, though child bearing is not her highest priority at this time.   - Plan for myomectomy per patient preference, patient aware of significant risk of blood transfusion, risk of damage to nearby organs and possible lifesaving hysterectomy.  Will type and cross for blood day before surgery.  - Pre-op exam by PCP  - Start OCP, continue iron supplement.    2.  Chronic microcytic anemia: consistent with Fe deficiency due to chronic blood loss.  - Start OCP, ortho tri-cyclen has worked well in the past  - Continue Fe supplements    I, Soco Jiménez MS3, am acting as the scribe for Jazmine Reardon MD.  All aspects of the exam and documentation were approved by the attending physician.    Soco Jiménez, MS3    I agree with the PFSH and ROS as completed by the medical student, except for changes made by me.  The remainder of the encounter was performed by me and scribed by the medical student.  The scribed note accurately reflects my personal services and the decisions made by me.  Jazmine Reardon MD    Total visit time was 45 minutes with 45 minutes spent in counseling and coordination of care for fibroid uterus and menorrhagia with resulting anemia .  Jazmine Reardon MD

## 2017-10-31 NOTE — LETTER
10/31/2017       RE: Cyndy Alfred  [address] [place] [place] [address]     Dear Colleague,    Thank you for referring your patient, Cyndy Alfred, to the [facility] at [facility]. Please see a copy of my visit note below.    CC:  Consult from Dr. Agbeh (PCP) for uterine fibroids    Subjective:  Ms. Alfred is a 42 year old  who presents for consultation of uterine fibroids.  She has a history of fibroids dating back ~10 years with symptoms of heavy menses that was briefly treated with OCP and lupron (1x cycle, stopped due to side effects).  Within the last month or so, she has been experiencing increasing back pain and bladder pressure/discomfort with nocturia and was seen by her PCP.  A pelvic MRI was done which showed multiple myometrial masses and an enlarged uterus compressing on her bladder and sacrum/coccyx.  Due to her new symptoms, Ms. Alfred decided that now is the time to explore treatment options for her fibroids.  She has done quite a bit of outside research regarding treatment options for fibroids, and is adamant against hysterectomy.  She is open to any other options.    Patient's last menstrual period was 10/20/2017.  Menses are regular q 28-30 days, lasting 4 days.  Heavy menses with the need to change pads every ~2 hours.    OB history:  She has had 2x C-sections (her children are 23 and 10 year old).  Her most recent pregnancy resulted in a 4 pound infant that was born at ~38 weeks.  It was noted at the time that her existing fibroids may have contributed to growth restriction of the infant.    Gyn history:  Last pap smear was normal on 7/7/15.    Last TSH: 1.47    PMH:  - Fe deficiency anemia, currently on oral supplementation BID  - negative screen for sickle cell and thalassemia in pregnancies.     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   with 2 children.  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    Patients  "records are available and reviewed at today's visit.    Past Medical History:   Diagnosis Date     Headache(784.0)      History of cardiac murmur childhood     PIH (Pregnancy Induced Hypertension), Previous Postpartum Condition      Uterine fibroid        Past Surgical History:   Procedure Laterality Date     C/SECTION, LOW TRANSVERSE      x2       Family History   Problem Relation Age of Onset     EYE* Paternal Grandfather      WENT BLIND-? UNSURE WHY     DIABETES Father      DIABETES Mother      Hypertension Maternal Grandmother      Hypertension Maternal Grandfather        Allergies: No known allergies    Current Outpatient Prescriptions   Medication Sig Dispense Refill     Ferrous Sulfate (IRON SUPPLEMENT PO) Take 325 mg by mouth 2 times daily (with meals)       Docusate Sodium (COLACE PO) Take 50 mg by mouth 2 times daily       VITAMIN D, CHOLECALCIFEROL, PO Take 2,000 Units by mouth daily       UNABLE TO FIND daily MEDICATION NAME: Green Tea Supplement       UNABLE TO FIND daily MEDICATION NAME: Tumeric supplement       Ibuprofen (MIDOL CRAMP FORMULA MAX ST PO) Take by mouth every 4 hours as needed for moderate pain       ROS:  C: NEGATIVE for fever, chills, change in weight  I: NEGATIVE for worrisome rashes, moles or lesions  E: NEGATIVE for vision changes or irritation  E/M: NEGATIVE for ear, mouth and throat problems  R: NEGATIVE for significant cough or SOB  CV: NEGATIVE for chest pain, palpitations or peripheral edema  GI: NEGATIVE for nausea, heartburn, or change in bowel habits, states BM are painful to pass and difficult  : NEGATIVE for dysuria, hematuria, vaginal discharge.  Has nocturia, urinary frequency.  M: NEGATIVE for significant arthralgias or myalgia. ++ Low back pain.  N: NEGATIVE for weakness, dizziness or paresthesias  E: NEGATIVE for temperature intolerance, skin/hair changes  P: NEGATIVE for changes in mood or affect    EXAM:  Blood pressure (!) 148/92, pulse 68, height 1.6 m (5' 3\"), " weight 71.6 kg (157 lb 12.8 oz), last menstrual period 10/20/2017, not currently breastfeeding.   BMI= Body mass index is 27.95 kg/(m^2).  General - pleasant female in no acute distress.  Abdomen - soft, nontender, nondistended, no hepatosplenomegaly.  Enlarged uterus up to level of umbilicus, mobile, moderately tender.  Well healed low transverse incision  Pelvic - patient declines    ASSESSMENT/PLAN:  1.  Uterine fibroids:  MRI showed innumerable uterine fibroids compressing the bladder and sacrum/coccyx area.  Discussed extensively the treatment options including hysterectomy vs myomectomy vs embolization and hysteroscopic myomectomy.  Patient is adamant about preserving her uterus, though child bearing is not her highest priority at this time.   - Plan for myomectomy per patient preference, patient aware of significant risk of blood transfusion, risk of damage to nearby organs and possible lifesaving hysterectomy.  Will type and cross for blood day before surgery.  - Pre-op exam by PCP  - Start OCP, continue iron supplement.    2.  Chronic microcytic anemia: consistent with Fe deficiency due to chronic blood loss.  - Start OCP, ortho tri-cyclen has worked well in the past  - Continue Fe supplements    I, Soco Jiménez MS3, am acting as the scribe for Jazmine Reardon MD.  All aspects of the exam and documentation were approved by the attending physician.    Soco Jiménez, MS3    I agree with the PFSH and ROS as completed by the medical student, except for changes made by me.  The remainder of the encounter was performed by me and scribed by the medical student.  The scribed note accurately reflects my personal services and the decisions made by me.  Jazmine Reardon MD    Total visit time was 45 minutes with 45 minutes spent in counseling and coordination of care for fibroid uterus and menorrhagia with resulting anemia .  Jazmine Reardon MD    Again, thank you for allowing me to participate in the care of your  patient.      Sincerely,    Jazmine Reardon MD

## 2017-10-31 NOTE — MR AVS SNAPSHOT
After Visit Summary   10/31/2017    Cyndy Alfred    MRN: [id number]           Patient Information     Date Of Birth          1975        Visit Information        Provider Department      10/31/2017 10:00 AM Jazmine Reardon MD [facility]        Today's Diagnoses     Intramural, submucous, and subserous leiomyoma of uterus    -  1    Menorrhagia with regular cycle        Iron deficiency anemia due to chronic blood loss           Follow-ups after your visit        Future tests that were ordered for you today     Open Future Orders        Priority Expected Expires Ordered    CBC with platelets Routine  10/31/2018 10/31/2017            Who to contact     Please call your clinic at [phone] to:    Ask questions about your health    Make or cancel appointments    Discuss your medicines    Learn about your test results    Speak to your doctor   If you have compliments or concerns about an experience at your clinic, or if you wish to file a complaint, please contact [facility] Patient Relations at [phone] or email us at [email or web address]         Additional Information About Your Visit        MyChart Information     [facility] gives you secure access to your electronic health record. If you see a primary care provider, you can also send messages to your care team and make appointments. If you have questions, please call your primary care clinic.  If you do not have a primary care provider, please call [phone] and they will assist you.      [employer] is an electronic gateway that provides easy, online access to your medical records. With [employer], you can request a clinic appointment, read your test results, renew a prescription or communicate with your care team.     To access your existing account, please contact your [facility] or call [phone] for assistance.        Care EveryWhere ID     This  "is your Care EveryWhere ID. This could be used by other organizations to access your [place] medical records  [id number]        Your Vitals Were     Pulse Height Last Period BMI (Body Mass Index)          68 1.6 m (5' 3\") 10/20/2017 27.95 kg/m2         Blood Pressure from Last 3 Encounters:   10/31/17 (!) 148/92   10/03/17 121/79   09/26/17 119/78    Weight from Last 3 Encounters:   10/31/17 71.6 kg (157 lb 12.8 oz)   10/03/17 71.2 kg (157 lb)   09/26/17 70 kg (154 lb 4.8 oz)              We Performed the Following     [facility]-Operative Worksheet (Abdominal Myomectomy)          Today's Medication Changes          These changes are accurate as of: 10/31/17  3:56 PM.  If you have any questions, ask your nurse or doctor.               Start taking these medicines.        Dose/Directions    norgestim-eth estrad triphasic 0.18/0.215/0.25 MG-35 MCG per tablet   Commonly known as:  TRINESSA (28)   Used for:  Menorrhagia with regular cycle, Intramural, submucous, and subserous leiomyoma of uterus   Started by:  Jazmine Reardon MD        Dose:  1 tablet   Take 1 tablet by mouth daily   Quantity:  28 tablet   Refills:  0            Where to get your medicines      These medications were sent to [facility] [address] [place] [address] [address]  [address] [place] AT [facility] [address]  [address] [place] [address]     Phone:  [phone]     norgestim-eth estrad triphasic 0.18/0.215/0.25 MG-35 MCG per tablet                Primary Care Provider Office Phone # Fax #    Yuxepi Mawuena Agbeh, [phone] [address] [facility] [address] [place] [address]        Equal Access to Services     [facility]: [place], waaxda luqadaha, qaybta kaalsarah orr . [facility] [phone].    ATENCIÓN: Si habla español, tiene a pratt disposición servicios gratuitos de asistencia lingüística. Llame al [phone].    We comply with applicable " federal civil rights laws and [place] laws. We do not discriminate on the basis of race, color, national origin, age, disability, sex, sexual orientation, or gender identity.            Thank you!     Thank you for choosing [facility]  for your care. Our goal is always to provide you with excellent care. Hearing back from our patients is one way we can continue to improve our services. Please take a few minutes to complete the written survey that you may receive in the mail after your visit with us. Thank you!             Your Updated Medication List - Protect others around you: Learn how to safely use, store and throw away your medicines at www.disposemymeds.org.          This list is accurate as of: 10/31/17  3:56 PM.  Always use your most recent med list.                   Brand Name Dispense Instructions for use Diagnosis    COLACE PO      Take 50 mg by mouth 2 times daily        IRON SUPPLEMENT PO      Take 325 mg by mouth 2 times daily (with meals)        MIDOL CRAMP FORMULA MAX ST PO      Take by mouth every 4 hours as needed for moderate pain        norgestim-eth estrad triphasic 0.18/0.215/0.25 MG-35 MCG per tablet    TRINESSA (28)    28 tablet    Take 1 tablet by mouth daily    Menorrhagia with regular cycle, Intramural, submucous, and subserous leiomyoma of uterus       * UNABLE TO FIND      daily MEDICATION NAME: Green Tea Supplement        * UNABLE TO FIND      daily MEDICATION NAME: Tumeric supplement        VITAMIN D (CHOLECALCIFEROL) PO      Take 2,000 Units by mouth daily    Intramural and submucous leiomyoma of uterus, Enlarged uterus, Iron deficiency anemia due to chronic blood loss       * Notice:  This list has 2 medication(s) that are the same as other medications prescribed for you. Read the directions carefully, and ask your doctor or other care provider to review them with you.

## 2017-11-01 ENCOUNTER — TELEPHONE (OUTPATIENT)
Dept: OBGYN | Facility: CLINIC | Age: 42
End: 2017-11-01

## 2017-11-16 ENCOUNTER — TELEPHONE (OUTPATIENT)
Dept: OBGYN | Facility: CLINIC | Age: 42
End: 2017-11-16

## 2018-05-18 ENCOUNTER — OFFICE VISIT (OUTPATIENT)
Dept: URGENT CARE | Facility: URGENT CARE | Age: 43
End: 2018-05-18
Payer: COMMERCIAL

## 2018-05-18 VITALS
SYSTOLIC BLOOD PRESSURE: 128 MMHG | BODY MASS INDEX: 29.27 KG/M2 | TEMPERATURE: 98.2 F | OXYGEN SATURATION: 100 % | DIASTOLIC BLOOD PRESSURE: 81 MMHG | WEIGHT: 165.25 LBS | HEART RATE: 81 BPM

## 2018-05-18 DIAGNOSIS — N30.01 ACUTE CYSTITIS WITH HEMATURIA: ICD-10-CM

## 2018-05-18 DIAGNOSIS — R30.0 DYSURIA: Primary | ICD-10-CM

## 2018-05-18 LAB
ALBUMIN UR-MCNC: NEGATIVE MG/DL
APPEARANCE UR: ABNORMAL
BILIRUB UR QL STRIP: NEGATIVE
COLOR UR AUTO: YELLOW
GLUCOSE UR STRIP-MCNC: NEGATIVE MG/DL
HGB UR QL STRIP: ABNORMAL
KETONES UR STRIP-MCNC: NEGATIVE MG/DL
LEUKOCYTE ESTERASE UR QL STRIP: ABNORMAL
NITRATE UR QL: NEGATIVE
NON-SQ EPI CELLS #/AREA URNS LPF: ABNORMAL /LPF
PH UR STRIP: 7 PH (ref 5–7)
RBC #/AREA URNS AUTO: ABNORMAL /HPF
SOURCE: ABNORMAL
SP GR UR STRIP: <=1.005 (ref 1–1.03)
UROBILINOGEN UR STRIP-ACNC: 0.2 EU/DL (ref 0.2–1)
WBC #/AREA URNS AUTO: ABNORMAL /HPF

## 2018-05-18 PROCEDURE — 87088 URINE BACTERIA CULTURE: CPT | Performed by: PHYSICIAN ASSISTANT

## 2018-05-18 PROCEDURE — 87186 SC STD MICRODIL/AGAR DIL: CPT | Performed by: PHYSICIAN ASSISTANT

## 2018-05-18 PROCEDURE — 87086 URINE CULTURE/COLONY COUNT: CPT | Performed by: PHYSICIAN ASSISTANT

## 2018-05-18 PROCEDURE — 99213 OFFICE O/P EST LOW 20 MIN: CPT | Performed by: PHYSICIAN ASSISTANT

## 2018-05-18 PROCEDURE — 81001 URINALYSIS AUTO W/SCOPE: CPT | Performed by: PHYSICIAN ASSISTANT

## 2018-05-18 RX ORDER — NITROFURANTOIN 25; 75 MG/1; MG/1
100 CAPSULE ORAL 2 TIMES DAILY
Qty: 14 CAPSULE | Refills: 0 | Status: SHIPPED | OUTPATIENT
Start: 2018-05-18 | End: 2018-09-14

## 2018-05-18 RX ORDER — OXYCODONE AND ACETAMINOPHEN 5; 325 MG/1; MG/1
TABLET ORAL
COMMUNITY
Start: 2007-05-13 | End: 2020-10-27

## 2018-05-18 RX ORDER — ACETAMINOPHEN 325 MG/1
TABLET ORAL
COMMUNITY
Start: 2007-05-13

## 2018-05-18 ASSESSMENT — ENCOUNTER SYMPTOMS
RHINORRHEA: 0
SORE THROAT: 0
DIARRHEA: 0
CHILLS: 0
VOMITING: 0
DIFFICULTY URINATING: 1
POLYDIPSIA: 0
NAUSEA: 0
SHORTNESS OF BREATH: 0
FEVER: 0
FATIGUE: 0
FLANK PAIN: 0
PALPITATIONS: 0
CARDIOVASCULAR NEGATIVE: 1
HEADACHES: 0
WEAKNESS: 0
GASTROINTESTINAL NEGATIVE: 1
HEMATURIA: 1
MYALGIAS: 0
FREQUENCY: 1
ABDOMINAL PAIN: 0
LIGHT-HEADEDNESS: 0
DIZZINESS: 0
DYSURIA: 1
COUGH: 0
BACK PAIN: 0

## 2018-05-18 NOTE — MR AVS SNAPSHOT
After Visit Summary   5/18/2018    Cyndy Alfred    MRN: [id number]           Patient Information     Date Of Birth          1975        Visit Information        Provider Department      5/18/2018 7:55 PM Herber Agrawal PA-C [facility]        Today's Diagnoses     Dysuria    -  1    Acute cystitis with hematuria           Follow-ups after your visit        Who to contact     If you have questions or need follow up information about today's clinic visit or your schedule please contact [facility] directly at [phone].  Normal or non-critical lab and imaging results will be communicated to you by [employer], letter or phone within 4 business days after the clinic has received the results. If you do not hear from us within 7 days, please contact the clinic through Bokec[employer] or phone. If you have a critical or abnormal lab result, we will notify you by phone as soon as possible.  Submit refill requests through [employer] or call your pharmacy and they will forward the refill request to us. Please allow 3 business days for your refill to be completed.          Additional Information About Your Visit        MyChart Information     [employer] gives you secure access to your electronic health record. If you see a primary care provider, you can also send messages to your care team and make appointments. If you have questions, please call your primary care clinic.  If you do not have a primary care provider, please call [phone] and they will assist you.        Care EveryWhere ID     This is your Care EveryWhere ID. This could be used by other organizations to access your [facility] medical records  [id number]        Your Vitals Were     Pulse Temperature Pulse Oximetry Breastfeeding? BMI (Body Mass Index)       81 98.2  F (36.8  C) (Oral) 100% No 29.27 kg/m2        Blood Pressure from Last 3 Encounters:   05/18/18 128/81   10/31/17 (!) 148/92   10/03/17 121/79     Weight from Last 3 Encounters:   05/18/18 165 lb 4 oz (75 kg)   10/31/17 157 lb 12.8 oz (71.6 kg)   10/03/17 157 lb (71.2 kg)              We Performed the Following     *UA reflex to Microscopic and Culture ([facility] and [facility] (except [place] and [place])     Urine Culture Aerobic Bacterial     Urine Microscopic          Today's Medication Changes          These changes are accurate as of 5/18/18  8:27 PM.  If you have any questions, ask your nurse or doctor.               Start taking these medicines.        Dose/Directions    nitroFURantoin (macrocrystal-monohydrate) 100 MG capsule   Commonly known as:  MACROBID   Used for:  Dysuria, Acute cystitis with hematuria   Started by:  Herber Agrawal PA-C        Dose:  100 mg   Take 1 capsule (100 mg) by mouth 2 times daily   Quantity:  14 capsule   Refills:  0            Where to get your medicines      These medications were sent to [facility] [address] [place] [address] [address] AT [facility] [address]  [address] [place] [address]     Phone:  [phone]     nitroFURantoin (macrocrystal-monohydrate) 100 MG capsule               Information about OPIOIDS     PRESCRIPTION OPIOIDS: WHAT YOU NEED TO KNOW   You have a prescription for an opioid (narcotic) pain medicine. Opioids can cause addiction. If you have a history of chemical dependency of any type, you are at a higher risk of becoming addicted to opioids. Only take this medicine after all other options have been tried. Take it for as short a time and as few doses as possible.     Do not:    Drive. If you drive while taking these medicines, you could be arrested for driving under the influence (DUI).    Operate heavy machinery    Do any other dangerous activities while taking these medicines.     Drink any alcohol while taking these medicines.      Take with any other medicines that contain acetaminophen. Read all labels carefully. Look for the word   acetaminophen  or  Tylenol.  Ask your pharmacist if you have questions or are unsure.    Store your pills in a secure place, locked if possible. We will not replace any lost or stolen medicine. If you don t finish your medicine, please throw away (dispose) as directed by your pharmacist. The [place] Pollution Control Agency has more information about safe disposal: https://www.pca.[facility].[place].us/living-green/managing-unwanted-medications    All opioids tend to cause constipation. Drink plenty of water and eat foods that have a lot of fiber, such as fruits, vegetables, prune juice, apple juice and high-fiber cereal. Take a laxative (Miralax, milk of magnesia, Colace, Senna) if you don t move your bowels at least every other day.          Primary Care Provider Office Phone # Fax #    Sparkle Mawuena Agbeh, [phone] [address] [place] [address]        Equal Access to Services     [facility] : Hadii aad ku hadasho Soomaali, waaxda luqadaha, qaybta kaalmada adeegyada, waxay idiin hayjorgiton ross arambula . So [facility] [phone].    ATENCIÓN: Si habla español, tiene a pratt disposición servicios gratuitos de asistencia lingüística. Pia al [phone].    We comply with applicable federal civil rights laws and [place] laws. We do not discriminate on the basis of race, color, national origin, age, disability, sex, sexual orientation, or gender identity.            Thank you!     Thank you for choosing [facility]  for your care. Our goal is always to provide you with excellent care. Hearing back from our patients is one way we can continue to improve our services. Please take a few minutes to complete the written survey that you may receive in the mail after your visit with us. Thank you!             Your Updated Medication List - Protect others around you: Learn how to safely use, store and throw away your medicines at www.disposemymeds.org.          This  list is accurate as of 5/18/18  8:27 PM.  Always use your most recent med list.                   Brand Name Dispense Instructions for use Diagnosis    COLACE PO      Take 50 mg by mouth 2 times daily        IRON SUPPLEMENT PO      Take 325 mg by mouth 2 times daily (with meals)        MIDOL CRAMP FORMULA MAX ST PO      Take by mouth every 4 hours as needed for moderate pain        nitroFURantoin (macrocrystal-monohydrate) 100 MG capsule    MACROBID    14 capsule    Take 1 capsule (100 mg) by mouth 2 times daily    Dysuria, Acute cystitis with hematuria       norgestim-eth estrad triphasic 0.18/0.215/0.25 MG-35 MCG per tablet    TRINESSA (28)    28 tablet    Take 1 tablet by mouth daily    Menorrhagia with regular cycle, Intramural, submucous, and subserous leiomyoma of uterus       oxyCODONE-acetaminophen 5-325 MG per tablet    PERCOCET          PAIN & FEVER 325 MG tablet   Generic drug:  acetaminophen           * UNABLE TO FIND      daily MEDICATION NAME: Green Tea Supplement        * UNABLE TO FIND      daily MEDICATION NAME: Tumeric supplement        VITAMIN D (CHOLECALCIFEROL) PO      Take 2,000 Units by mouth daily    Intramural and submucous leiomyoma of uterus, Enlarged uterus, Iron deficiency anemia due to chronic blood loss       * Notice:  This list has 2 medication(s) that are the same as other medications prescribed for you. Read the directions carefully, and ask your doctor or other care provider to review them with you.

## 2018-05-19 NOTE — PROGRESS NOTES
Chief Complaint:    Chief Complaint   Patient presents with     Urinary Problem     Blood in urine this morning, not time for cycle, pain at end of voiding, no possibility of pregnancy, has uterine fibroids        HPI:  Cyndy Alfred is a 43 year old female who has symptoms of urinary dysuria, urgency and frequency for 1 day(s).  she denies back pain, fever and hematuria.    ROS:      Review of Systems   Constitutional: Negative for chills, fatigue and fever.   HENT: Negative for congestion, ear pain, rhinorrhea and sore throat.    Respiratory: Negative for cough and shortness of breath.    Cardiovascular: Negative.  Negative for chest pain and palpitations.   Gastrointestinal: Negative.  Negative for abdominal pain, diarrhea, nausea and vomiting.   Endocrine: Negative for polydipsia and polyuria.   Genitourinary: Positive for difficulty urinating, dysuria, frequency, hematuria and urgency. Negative for flank pain, pelvic pain, vaginal discharge and vaginal pain.   Musculoskeletal: Negative for back pain and myalgias.   Allergic/Immunologic: Negative for immunocompromised state.   Neurological: Negative for dizziness, weakness, light-headedness and headaches.       Family History   Family History   Problem Relation Age of Onset     EYE* Paternal Grandfather      WENT BLIND-? UNSURE WHY     DIABETES Father      DIABETES Mother      Hypertension Maternal Grandmother      Hypertension Maternal Grandfather         Problem history  Patient Active Problem List   Diagnosis     CARDIOVASCULAR SCREENING; LDL GOAL LESS THAN 160     Abnormal TSH     Pelvic pain in female     Intramural and submucous leiomyoma of uterus     Enlarged uterus     Iron deficiency anemia due to chronic blood loss        Allergies  Allergies   Allergen Reactions     No Known Allergies         Social History  Social History     Social History     Marital status: Single     Spouse name: N/A     Number of children: N/A     Years of education: N/A      Occupational History     nurse  [employer]      Social History Main Topics     Smoking status: Never Smoker     Smokeless tobacco: Never Used     Alcohol use Yes      Comment: socially      Drug use: No     Sexual activity: Yes     Partners: Male     Birth control/ protection: Condom     Other Topics Concern      Service No     Blood Transfusions No     Caffeine Concern No     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     Hobby Hazards No     Sleep Concern No     Stress Concern No     Weight Concern No     Special Diet No     Back Care No     Exercise Yes     3 days a week     Bike Helmet Yes     Seat Belt Yes     Self-Exams Yes     Social History Narrative        Current Meds    Current Outpatient Prescriptions:      nitroFURantoin, macrocrystal-monohydrate, (MACROBID) 100 MG capsule, Take 1 capsule (100 mg) by mouth 2 times daily, Disp: 14 capsule, Rfl: 0     acetaminophen (PAIN & FEVER) 325 MG tablet, , Disp: , Rfl:      Docusate Sodium (COLACE PO), Take 50 mg by mouth 2 times daily, Disp: , Rfl:      Ferrous Sulfate (IRON SUPPLEMENT PO), Take 325 mg by mouth 2 times daily (with meals), Disp: , Rfl:      Ibuprofen (MIDOL CRAMP FORMULA MAX ST PO), Take by mouth every 4 hours as needed for moderate pain, Disp: , Rfl:      norgestim-eth estrad triphasic (TRINESSA, 28,) 0.18/0.215/0.25 MG-35 MCG per tablet, Take 1 tablet by mouth daily (Patient not taking: Reported on 5/18/2018), Disp: 28 tablet, Rfl: 0     oxyCODONE-acetaminophen (PERCOCET) 5-325 MG per tablet, , Disp: , Rfl:      UNABLE TO FIND, daily MEDICATION NAME: Green Tea Supplement, Disp: , Rfl:      UNABLE TO FIND, daily MEDICATION NAME: Tumeric supplement, Disp: , Rfl:      VITAMIN D, CHOLECALCIFEROL, PO, Take 2,000 Units by mouth daily, Disp: , Rfl:      OBJECTIVE     Vital signs noted and reviewed by Herber Agrawal  /81 (BP Location: Left arm, Patient Position: Chair, Cuff Size: Adult Regular)  Pulse 81  Temp 98.2  F (36.8  C)  (Oral)  Wt 165 lb 4 oz (75 kg)  SpO2 100%  Breastfeeding? No  BMI 29.27 kg/m2     PEFR:  General appearance: healthy, alert and no distress  Ears: R TM - normal: no effusions, no erythema, and normal landmarks, L TM - normal: no effusions, no erythema, and normal landmarks  Eyes: R normal, L normal  Nose: normal  Oropharynx: normal  Neck: supple and no adenopathy  Lungs: normal and clear to auscultation  Heart: S1, S2 normal, no murmur, click, rub or gallop, regular rate and rhythm  Abdomen: Abdomen soft, non-tender without masses or organomegaly           Labs:        Results for orders placed or performed in visit on 05/18/18   *UA reflex to Microscopic and Culture ([place] and [facility] (except [place] and [place])   Result Value Ref Range    Color Urine Yellow     Appearance Urine Slightly Cloudy     Glucose Urine Negative NEG^Negative mg/dL    Bilirubin Urine Negative NEG^Negative    Ketones Urine Negative NEG^Negative mg/dL    Specific Gravity Urine <=1.005 1.003 - 1.035    Blood Urine Moderate (A) NEG^Negative    pH Urine 7.0 5.0 - 7.0 pH    Protein Albumin Urine Negative NEG^Negative mg/dL    Urobilinogen Urine 0.2 0.2 - 1.0 EU/dL    Nitrite Urine Negative NEG^Negative    Leukocyte Esterase Urine Trace (A) NEG^Negative    Source Midstream Urine    Urine Microscopic   Result Value Ref Range    WBC Urine 5-10 (A) OTO5^0 - 5 /HPF    RBC Urine 10-25 (A) OTO2^O - 2 /HPF    Squamous Epithelial /LPF Urine Few FEW^Few /LPF          ASSESSMENT     (R30.0) Dysuria  (primary encounter diagnosis)  Plan: *UA reflex to Microscopic and Culture ([place]         and [facility] (except [place] and         [place]), Urine Culture Aerobic Bacterial,         Urine Microscopic, nitroFURantoin,         macrocrystal-monohydrate, (MACROBID) 100 MG         capsule    (N30.01) Acute cystitis with hematuria  Plan: nitroFURantoin, macrocrystal-monohydrate,         (MACROBID) 100 MG capsule       PLAN  Urinalysis  discussed with patient  Treatment currentguidelines - also push fluids, may use Pyridium OTC prn. Follow up with PCP if these symptoms worsen or fail to improve as anticipated.    Rx for Macrobid tonight.    We will call with culture results if resistant.        Herber Agrawal  5/18/2018, 8:00 PM

## 2018-05-21 LAB
BACTERIA SPEC CULT: ABNORMAL
SPECIMEN SOURCE: ABNORMAL

## 2018-07-17 ENCOUNTER — OFFICE VISIT (OUTPATIENT)
Dept: OBGYN | Facility: CLINIC | Age: 43
End: 2018-07-17
Attending: OBSTETRICS & GYNECOLOGY
Payer: COMMERCIAL

## 2018-07-17 VITALS
WEIGHT: 163.7 LBS | HEART RATE: 88 BPM | DIASTOLIC BLOOD PRESSURE: 81 MMHG | SYSTOLIC BLOOD PRESSURE: 125 MMHG | BODY MASS INDEX: 29 KG/M2

## 2018-07-17 DIAGNOSIS — D25.2 INTRAMURAL, SUBMUCOUS, AND SUBSEROUS LEIOMYOMA OF UTERUS: Primary | ICD-10-CM

## 2018-07-17 DIAGNOSIS — D25.0 INTRAMURAL, SUBMUCOUS, AND SUBSEROUS LEIOMYOMA OF UTERUS: Primary | ICD-10-CM

## 2018-07-17 DIAGNOSIS — D25.1 INTRAMURAL, SUBMUCOUS, AND SUBSEROUS LEIOMYOMA OF UTERUS: Primary | ICD-10-CM

## 2018-07-17 PROCEDURE — G0463 HOSPITAL OUTPT CLINIC VISIT: HCPCS | Mod: ZF

## 2018-07-17 RX ORDER — CEFAZOLIN SODIUM 1 G/3ML
1 INJECTION, POWDER, FOR SOLUTION INTRAMUSCULAR; INTRAVENOUS SEE ADMIN INSTRUCTIONS
Status: CANCELLED | OUTPATIENT
Start: 2018-07-17

## 2018-07-17 RX ORDER — ACETAMINOPHEN 325 MG/1
975 TABLET ORAL ONCE
Status: CANCELLED | OUTPATIENT
Start: 2018-07-17 | End: 2018-07-17

## 2018-07-17 RX ORDER — PHENAZOPYRIDINE HYDROCHLORIDE 100 MG/1
200 TABLET, FILM COATED ORAL ONCE
Status: CANCELLED | OUTPATIENT
Start: 2018-07-17 | End: 2018-07-17

## 2018-07-17 NOTE — MR AVS SNAPSHOT
After Visit Summary   7/17/2018    Cyndy Alfred    MRN: [id number]           Patient Information     Date Of Birth          1975        Visit Information        Provider Department      7/17/2018 4:15 PM Jazmine Reardon MD [facility]        Today's Diagnoses     Intramural, submucous, and subserous leiomyoma of uterus    -  1       Follow-ups after your visit        Who to contact     Please call your clinic at [phone] to:    Ask questions about your health    Make or cancel appointments    Discuss your medicines    Learn about your test results    Speak to your doctor            Additional Information About Your Visit        MyChart Information     [employer] gives you secure access to your electronic health record. If you see a primary care provider, you can also send messages to your care team and make appointments. If you have questions, please call your primary care clinic.  If you do not have a primary care provider, please call [phone] and they will assist you.      [employer]CAT is an electronic gateway that provides easy, online access to your medical records. With [employer]CAT, you can request a clinic appointment, read your test results, renew a prescription or communicate with your care team.     To access your existing account, please contact your [facility] or call [phone] for assistance.        Care EveryWhere ID     This is your Care EveryWhere ID. This could be used by other organizations to access your [facility] medical records  [id number]        Your Vitals Were     Pulse Last Period BMI (Body Mass Index)             88 07/14/2018 29 kg/m2          Blood Pressure from Last 3 Encounters:   07/17/18 125/81   05/18/18 128/81   10/31/17 (!) 148/92    Weight from Last 3 Encounters:   07/17/18 74.3 kg (163 lb 11.2 oz)   05/18/18 75 kg (165 lb 4 oz)   10/31/17 71.6 kg (157 lb 12.8 oz)              We Performed the Following      [facility]-Operative Worksheet (DaVinci Total Laparoscopic Hysterectomy, Bilateral Salpingectomy)        Primary Care Provider Office Phone # Fax #    Sparkle Mawuena Agbeh, [phone] [address] [place] [address]        Equal Access to Services     [facility]: Hadii aad ku hadasho Soomaali, waaxda luqadaha, qaybta kaalmada adeegyada, waxay idiin hayaan adejoselito khbryan la'aan . So [facility] [phone].    ATENCIÓN: Si habla español, tiene a pratt disposición servicios gratuitos de asistencia lingüística. Sheame al [phone].    We comply with applicable federal civil rights laws and [place] laws. We do not discriminate on the basis of race, color, national origin, age, disability, sex, sexual orientation, or gender identity.            Thank you!     Thank you for choosing [facility]  for your care. Our goal is always to provide you with excellent care. Hearing back from our patients is one way we can continue to improve our services. Please take a few minutes to complete the written survey that you may receive in the mail after your visit with us. Thank you!             Your Updated Medication List - Protect others around you: Learn how to safely use, store and throw away your medicines at www.disposemymeds.org.          This list is accurate as of 7/17/18 11:59 PM.  Always use your most recent med list.                   Brand Name Dispense Instructions for use Diagnosis    COLACE PO      Take 50 mg by mouth 2 times daily        IRON SUPPLEMENT PO      Take 325 mg by mouth 2 times daily (with meals)        MIDOL CRAMP FORMULA MAX ST PO      Take by mouth every 4 hours as needed for moderate pain        nitroFURantoin (macrocrystal-monohydrate) 100 MG capsule    MACROBID    14 capsule    Take 1 capsule (100 mg) by mouth 2 times daily    Dysuria, Acute cystitis with hematuria       norgestim-eth estrad triphasic 0.18/0.215/0.25 MG-35 MCG per tablet    TRINESSA  (28)    28 tablet    Take 1 tablet by mouth daily    Menorrhagia with regular cycle, Intramural, submucous, and subserous leiomyoma of uterus       oxyCODONE-acetaminophen 5-325 MG per tablet    PERCOCET          PAIN & FEVER 325 MG tablet   Generic drug:  acetaminophen           * UNABLE TO FIND      daily MEDICATION NAME: Green Tea Supplement        * UNABLE TO FIND      daily MEDICATION NAME: Tumeric supplement        VITAMIN D (CHOLECALCIFEROL) PO      Take 2,000 Units by mouth daily    Intramural and submucous leiomyoma of uterus, Enlarged uterus, Iron deficiency anemia due to chronic blood loss       * Notice:  This list has 2 medication(s) that are the same as other medications prescribed for you. Read the directions carefully, and ask your doctor or other care provider to review them with you.

## 2018-07-17 NOTE — PROGRESS NOTES
42 yo  returns to discuss management of fibroid uterus.  Patient had previously desired a myomectomy; however, she had to cancel surgery when her mom had stroke.  She has since continued to consider recommendation of Dr. Agbeh and myselft that given she plans no future fertility and the many myomas present a hysterectomy would be the better choice for treatment.   She states that her menses are monthly, last a week, no intermenstrual bleeding.  Menses are very heavy , passes clots, needs to leave work frequently because of needing to go to the bathroom to change her pad (patient is a RN at [employer]).  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  She did start taking a medicine from [employer] called blood builder (iron supplement).  With this supplement alone her hemoglobin was last checked in December and was 11.2.  Has also tried DIM (to balance hormones) and this has helped with perimenopausal symptoms.     ROS: 10 point ROS neg other than the symptoms noted above in the HPI.    Past Medical History:   Diagnosis Date     Headache(784.0)      History of cardiac murmur childhood     PIH (Pregnancy Induced Hypertension), Previous Postpartum Condition      Uterine fibroid      Past Surgical History:   Procedure Laterality Date     C/SECTION, LOW TRANSVERSE      x2     CYSTECTOMY OVARIAN BENIGN  2003     Family History   Problem Relation Age of Onset     EYE* Paternal Grandfather      WENT BLIND-? UNSURE WHY     Diabetes Father      Diabetes Mother      Hypertension Maternal Grandmother      Hypertension Maternal Grandfather      Social History     Social History     Marital status: Single     Spouse name: N/A     Number of children: N/A     Years of education: N/A     Occupational History     nurse  [employer]      Social History Main Topics     Smoking status: Never Smoker     Smokeless tobacco: Never Used     Alcohol use Yes       Comment: socially      Drug use: No     Sexual activity: Yes     Partners: Male     Birth control/ protection: Condom     Other Topics Concern      Service No     Blood Transfusions No     Caffeine Concern No     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     Hobby Hazards No     Sleep Concern No     Stress Concern No     Weight Concern No     Special Diet No     Back Care No     Exercise Yes     3 days a week     Bike Helmet Yes     Seat Belt Yes     Self-Exams Yes     Social History Narrative     Physical exam:  /81  Pulse 88  Wt 74.3 kg (163 lb 11.2 oz)  LMP 07/14/2018  BMI 29 kg/m2  Gen'l: appears well, no distress  Neck: no thyromegaly  CV: RRR  Resp: non-labored  Abd: soft, mobile mass (fibroid uterus) to 2 cm below the umbilicus  Pelvic deferred  Ext: no edema    Assessment/Plan  Fibroid uterus  AUB-L with anemia    - Medical and surgical options which are applicable for differing symptoms were reviewed including: OCPs, Mirena IUD, hysteroscopy, ablation, myomectomy, UFE, u/s surgery at [place] and hysterectomy.  Discussed in given patient has no desire for future pregnancy, and symptoms most related to bleeding likely best options include hysterectomy and UFE.  Patient is was previously counseled on  UFE and declines.  Plan for hysterectomy.    - Discussed route of hysterectomy, uterus is large, but given mobility could consider trial of RA laparoscopic hysterectomy/BS.  Patient counseled on risk of needing to make large incision, but is happy to try minimally invasive route.  Discussed need for morcellation, given large size of uterus.  Plan to hand morcellate in a bag.    - given AUB, continue iron, declines hormonal management until surgery.  Recommend EMB to r/o hyperplasia.  Patient is also due for a pap smear.  Patient will schedule this before surgery.  Jazmine eRardon MD

## 2018-07-17 NOTE — LETTER
7/17/2018       RE: Cyndy Alfred  [address] [place] [address]     Dear Colleague,    Thank you for referring your patient, Cyndy Alfred, to the [facility] at [facility]. Please see a copy of my visit note below.    42 yo  returns to discuss management of fibroid uterus.  Patient had previously desired a myomectomy; however, she had to cancel surgery when her mom had stroke.  She has since continued to consider recommendation of Dr. Agbeh and myselft that given she plans no future fertility and the many myomas present a hysterectomy would be the better choice for treatment.   She states that her menses are monthly, last a week, no intermenstrual bleeding.  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  She did start taking a medicine from [employer] called blood builder (iron supplement).  With this supplement alone her hemoglobin was last checked in December and was 11.2.  Has also tried DIM (to balance hormones) and this has helped with perimenopausal symptoms.     ROS: 10 point ROS neg other than the symptoms noted above in the HPI.    Past Medical History:   Diagnosis Date     Headache(784.0)      History of cardiac murmur childhood     PIH (Pregnancy Induced Hypertension), Previous Postpartum Condition      Uterine fibroid      Past Surgical History:   Procedure Laterality Date     C/SECTION, LOW TRANSVERSE      x2     CYSTECTOMY OVARIAN BENIGN  2003     Family History   Problem Relation Age of Onset     EYE* Paternal Grandfather      WENT BLIND-? UNSURE WHY     Diabetes Father      Diabetes Mother      Hypertension Maternal Grandmother      Hypertension Maternal Grandfather      Social History      Social History     Marital status: Single     Spouse name: N/A     Number of children: N/A     Years of education: N/A     Occupational History     nurse  [employer]      Social History Main Topics     Smoking status: Never Smoker     Smokeless tobacco: Never Used     Alcohol use Yes      Comment: socially      Drug use: No     Sexual activity: Yes     Partners: Male     Birth control/ protection: Condom     Other Topics Concern      Service No     Blood Transfusions No     Caffeine Concern No     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     Hobby Hazards No     Sleep Concern No     Stress Concern No     Weight Concern No     Special Diet No     Back Care No     Exercise Yes     3 days a week     Bike Helmet Yes     Seat Belt Yes     Self-Exams Yes     Social History Narrative     Physical exam:  /81  Pulse 88  Wt 74.3 kg (163 lb 11.2 oz)  LMP 07/14/2018  BMI 29 kg/m2  Gen'l: appears well, no distress  Neck: no thyromegaly  CV: RRR  Resp: non-labored  Abd: soft, mobile mass (fibroid uterus) to 2 cm below the umbilicus  Pelvic deferred  Ext: no edema    Assessment/Plan  Fibroid uterus  AUB-L with anemia    - Medical and surgical options which are applicable for differing symptoms were reviewed including: OCPs, Mirena IUD, hysteroscopy, ablation, myomectomy, UFE, u/s surgery at [facility] and hysterectomy.  Discussed in given patient has no desire for future pregnancy, and symptoms most related to bleeding likely best options include hysterectomy and UFE.  Patient is was previously counseled on  UFE and declines.  Plan for hysterectomy.    - Discussed route of hysterectomy, uterus is large, but given mobility could consider trial of RA laparoscopic hysterectomy/BS.  Patient counseled on risk of needing to make large incision, but is happy to try minimally invasive route.  Discussed need for morcellation, given large size of uterus.  Plan to hand morcellate in a bag.    - given AUB, continue  iron, declines hormonal management until surgery.  Recommend EMB to r/o hyperplasia.  Patient is also due for a pap smear.  Patient will schedule this before surgery.      Again, thank you for allowing me to participate in the care of your patient.      Sincerely,    Jazmine Reardon MD

## 2018-07-20 ENCOUNTER — HOSPITAL ENCOUNTER (OUTPATIENT)
Facility: CLINIC | Age: 43
End: 2018-07-20
Attending: OBSTETRICS & GYNECOLOGY | Admitting: OBSTETRICS & GYNECOLOGY
Payer: COMMERCIAL

## 2018-07-24 ENCOUNTER — TELEPHONE (OUTPATIENT)
Dept: OBGYN | Facility: CLINIC | Age: 43
End: 2018-07-24

## 2018-07-24 NOTE — TELEPHONE ENCOUNTER
Confirmed surgery date with patient 11/2/18 with arrival time at 5:30a.m with nothing to eat eight hours before scheduled surgery time and clear liquids up to two hours before scheduled surgery time, also informed patient that she will need to sched a pre op physical before surgery and that a map and letter will be mailed out.     to complete the following fields:            CHECKLIST     Google Calendar : Yes     Resident notified:d Not Applicable     Clinic schedule blocked:  Not Applicable    Patient notified:Yes      Pre op information sent: Yes     Given to patient over the phone.Yes    Comments:

## 2018-09-09 ENCOUNTER — HEALTH MAINTENANCE LETTER (OUTPATIENT)
Age: 43
End: 2018-09-09

## 2018-09-14 ENCOUNTER — OFFICE VISIT (OUTPATIENT)
Dept: OBGYN | Facility: CLINIC | Age: 43
End: 2018-09-14
Attending: OBSTETRICS & GYNECOLOGY
Payer: COMMERCIAL

## 2018-09-14 VITALS
BODY MASS INDEX: 26.7 KG/M2 | HEIGHT: 64 IN | DIASTOLIC BLOOD PRESSURE: 82 MMHG | WEIGHT: 156.4 LBS | HEART RATE: 80 BPM | SYSTOLIC BLOOD PRESSURE: 127 MMHG

## 2018-09-14 DIAGNOSIS — N93.9 ABNORMAL UTERINE BLEEDING (AUB): Primary | ICD-10-CM

## 2018-09-14 DIAGNOSIS — Z12.4 SCREENING FOR MALIGNANT NEOPLASM OF CERVIX: ICD-10-CM

## 2018-09-14 DIAGNOSIS — D25.9 UTERINE LEIOMYOMA, UNSPECIFIED LOCATION: ICD-10-CM

## 2018-09-14 PROCEDURE — 88305 TISSUE EXAM BY PATHOLOGIST: CPT | Performed by: OBSTETRICS & GYNECOLOGY

## 2018-09-14 PROCEDURE — G0145 SCR C/V CYTO,THINLAYER,RESCR: HCPCS | Performed by: OBSTETRICS & GYNECOLOGY

## 2018-09-14 PROCEDURE — 87624 HPV HI-RISK TYP POOLED RSLT: CPT | Performed by: OBSTETRICS & GYNECOLOGY

## 2018-09-14 PROCEDURE — 58100 BIOPSY OF UTERUS LINING: CPT | Mod: ZF | Performed by: OBSTETRICS & GYNECOLOGY

## 2018-09-14 NOTE — NURSING NOTE
Chief Complaint   Patient presents with     Gyn Exam     Pap smear       See ALEXANDER Avitia 9/14/2018

## 2018-09-14 NOTE — LETTER
"2018     RE: Cyndy Alfred  [address] [place] [address]     Dear Colleague,    Thank you for referring your patient, Cyndy Alfred, to the [facility] at [facility]. Please see a copy of my visit note below.    Endometrial Biopsy  Patient is scheduled for hysterectomy on 2018 for fibroid uterus.  Needs pap and EMB prior to surgery.      Menstrual History:  Menstrual History 10/31/2017 10/31/2017 2018   LAST MENSTRUAL PERIOD 10/20/2017 - 2018   Menarche age - 11 -   Period Cycle (Days) - 28 days -   Period Duration (Days) - 4-5 days -   Method of Contraception - Condoms -   Period Pattern - Regular -   Menstrual Flow - Heavy -   Menstrual Control - Maxi pad -   Menstrual Control Change Freq (Hours) - every hour  -   Dysmenorrhea - Moderate -   PMS Symptoms - Cramping;Mood Changes;Other (Comment) -   Reviewed Today - Yes -   Comments - lower back ache  -       Time Out - \"Pause for the Cause\"  Just before the procedure begins, through verbal and active participation of team members, verify:                      Initials   Patient Name mip   Patient  mip   Procedure to be performed mip                                                                                                                                      Indication: menorrhagia  Faculty: Alexys  Pregnancy test:  negative    Consent: Risks, benefits of treatment, and no treatment were discussed.  Patient's questions were elicited and answered. , Written consent signed and scanned into medical record. and Patient received and verbalized understanding of discharge instructions    Using a large Graves speculum, the cervix was visualized. A pap smear was obtained.  The cervix was prepped with Betadine.  A single tooth tenaculum was applied to the anterior lip of the cervix. The endometrial pipelle was advanced through the cervix without difficulty and a sample " collected. No additional pass was made.  The tenaculum was removed from the cervix and the tenaculum site made hemostatic with pressure.    EBL: scant.    Complications:  None apparent  Pathology: EMB sample was sent to pathology.  Tolerance of Procedure:  Patient did tolerate the procedure well.     Patient was instructed to call if she experiences any heavy bleeding, severe cramping, or abnormal vaginal discharge.  May take ibuprofen 400-800 mg PO TID PRN or naproxen 500 mg PO BID for cramping.    Will notify patient of results.     Jazmine Reardon MD

## 2018-09-14 NOTE — PROGRESS NOTES
"    Endometrial Biopsy  Patient is scheduled for hysterectomy on 2018 for fibroid uterus.  Needs pap and EMB prior to surgery.      Menstrual History:  Menstrual History 10/31/2017 10/31/2017 2018   LAST MENSTRUAL PERIOD 10/20/2017 - 2018   Menarche age - 11 -   Period Cycle (Days) - 28 days -   Period Duration (Days) - 4-5 days -   Method of Contraception - Condoms -   Period Pattern - Regular -   Menstrual Flow - Heavy -   Menstrual Control - Maxi pad -   Menstrual Control Change Freq (Hours) - every hour  -   Dysmenorrhea - Moderate -   PMS Symptoms - Cramping;Mood Changes;Other (Comment) -   Reviewed Today - Yes -   Comments - lower back ache  -       Time Out - \"Pause for the Cause\"  Just before the procedure begins, through verbal and active participation of team members, verify:                      Initials   Patient Name mip   Patient  mip   Procedure to be performed mip                                                                                                                                      Indication: menorrhagia  Faculty: Alexys  Pregnancy test:  negative    Consent: Risks, benefits of treatment, and no treatment were discussed.  Patient's questions were elicited and answered. , Written consent signed and scanned into medical record. and Patient received and verbalized understanding of discharge instructions    Using a large Graves speculum, the cervix was visualized. A pap smear was obtained.  The cervix was prepped with Betadine.  A single tooth tenaculum was applied to the anterior lip of the cervix. The endometrial pipelle was advanced through the cervix without difficulty and a sample collected. No additional pass was made.  The tenaculum was removed from the cervix and the tenaculum site made hemostatic with pressure.    EBL: scant.    Complications:  None apparent  Pathology: EMB sample was sent to pathology.  Tolerance of Procedure:  Patient did tolerate the procedure " well.     Patient was instructed to call if she experiences any heavy bleeding, severe cramping, or abnormal vaginal discharge.  May take ibuprofen 400-800 mg PO TID PRN or naproxen 500 mg PO BID for cramping.    Will notify patient of results.     Jazmine Reardon MD

## 2018-09-14 NOTE — MR AVS SNAPSHOT
"              After Visit Summary   9/14/2018    Cynyd Alfred    MRN: [id number]           Patient Information     Date Of Birth          1975        Visit Information        Provider Department      9/14/2018 8:30 AM Jazmine Reardon MD [facility]        Today's Diagnoses     Abnormal uterine bleeding (AUB)    -  1    Uterine leiomyoma, unspecified location        Screening for malignant neoplasm of cervix           Follow-ups after your visit        Your next 10 appointments already scheduled     Nov 02, 2018   Procedure with Jazmine Reardon MD   [facility], Same Day Surgery (--)    [address] [address] [phone]              Who to contact     Please call your clinic at [phone] to:    Ask questions about your health    Make or cancel appointments    Discuss your medicines    Learn about your test results    Speak to your doctor            Additional Information About Your Visit        MyChart Information     [employer] gives you secure access to your electronic health record. If you see a primary care provider, you can also send messages to your care team and make appointments. If you have questions, please call your primary care clinic.  If you do not have a primary care provider, please call [phone] and they will assist you.      [employer] is an electronic gateway that provides easy, online access to your medical records. With [employer], you can request a clinic appointment, read your test results, renew a prescription or communicate with your care team.     To access your existing account, please contact your [facility] or call [phone] for assistance.        Care EveryWhere ID     This is your Care EveryWhere ID. This could be used by other organizations to access your [facility] medical records  [id number]        Your Vitals Were     Pulse Height BMI (Body Mass Index)             80 1.613 m (5' 3.5\") 27.27 kg/m2  "         Blood Pressure from Last 3 Encounters:   09/14/18 127/82   07/17/18 125/81   05/18/18 128/81    Weight from Last 3 Encounters:   09/14/18 70.9 kg (156 lb 6.4 oz)   07/17/18 74.3 kg (163 lb 11.2 oz)   05/18/18 75 kg (165 lb 4 oz)              We Performed the Following     Endometrial Biopsy without Cervical Dilation [[id number]]     HPV High Risk Types DNA Cervical     Obtaining, preparing and conveyance of cervical or vaginal smear to laboratory.     Pap imaged thin layer screen with HPV - recommended age 30 - 65 years (select HPV order below)     Surgical pathology exam [[id number]]        Primary Care Provider Office Phone # Fax #    Sparkle Mawuena Agbeh, [phone] [address] [address]        Equal Access to Services     [facility]ES [facility] : Hadii naren lee hadasho Sozehra, waaxda luqadaha, qaybta kaalmada adeegyada, sarah arambula . So [facility] [phone].    ATENCIÓN: Si habla español, tiene a pratt disposición servicios gratuitos de asistencia lingüística. Llame al [phone].    We comply with applicable federal civil rights laws and [place] laws. We do not discriminate on the basis of race, color, national origin, age, disability, sex, sexual orientation, or gender identity.            Thank you!     Thank you for choosing [facility]  for your care. Our goal is always to provide you with excellent care. Hearing back from our patients is one way we can continue to improve our services. Please take a few minutes to complete the written survey that you may receive in the mail after your visit with us. Thank you!             Your Updated Medication List - Protect others around you: Learn how to safely use, store and throw away your medicines at www.disposemymeds.org.          This list is accurate as of 9/14/18 11:59 PM.  Always use your most recent med list.                   Brand Name Dispense Instructions for use Diagnosis     COLACE PO      Take 50 mg by mouth 2 times daily        IRON SUPPLEMENT PO      Take 325 mg by mouth 2 times daily (with meals)        MIDOL CRAMP FORMULA MAX ST PO      Take by mouth every 4 hours as needed for moderate pain        oxyCODONE-acetaminophen 5-325 MG per tablet    PERCOCET          PAIN & FEVER 325 MG tablet   Generic drug:  acetaminophen           * UNABLE TO FIND      daily MEDICATION NAME: Green Tea Supplement        * UNABLE TO FIND      daily MEDICATION NAME: Tumeric supplement        VITAMIN D (CHOLECALCIFEROL) PO      Take 2,000 Units by mouth daily    Intramural and submucous leiomyoma of uterus, Enlarged uterus, Iron deficiency anemia due to chronic blood loss       * Notice:  This list has 2 medication(s) that are the same as other medications prescribed for you. Read the directions carefully, and ask your doctor or other care provider to review them with you.

## 2018-09-18 LAB — COPATH REPORT: NORMAL

## 2018-09-19 LAB
COPATH REPORT: NORMAL
PAP: NORMAL

## 2018-09-21 LAB
FINAL DIAGNOSIS: NORMAL
HPV HR 12 DNA CVX QL NAA+PROBE: NEGATIVE
HPV16 DNA SPEC QL NAA+PROBE: NEGATIVE
HPV18 DNA SPEC QL NAA+PROBE: NEGATIVE
SPECIMEN DESCRIPTION: NORMAL
SPECIMEN SOURCE CVX/VAG CYTO: NORMAL

## 2019-10-23 ENCOUNTER — OFFICE VISIT (OUTPATIENT)
Dept: URGENT CARE | Facility: URGENT CARE | Age: 44
End: 2019-10-23
Payer: COMMERCIAL

## 2019-10-23 ENCOUNTER — NURSE TRIAGE (OUTPATIENT)
Dept: NURSING | Facility: CLINIC | Age: 44
End: 2019-10-23

## 2019-10-23 VITALS
WEIGHT: 156.2 LBS | DIASTOLIC BLOOD PRESSURE: 87 MMHG | OXYGEN SATURATION: 100 % | SYSTOLIC BLOOD PRESSURE: 150 MMHG | TEMPERATURE: 98.4 F | BODY MASS INDEX: 27.24 KG/M2 | HEART RATE: 74 BPM

## 2019-10-23 DIAGNOSIS — M54.6 ACUTE RIGHT-SIDED THORACIC BACK PAIN: Primary | ICD-10-CM

## 2019-10-23 PROCEDURE — 99213 OFFICE O/P EST LOW 20 MIN: CPT | Performed by: NURSE PRACTITIONER

## 2019-10-23 RX ORDER — IBUPROFEN 800 MG/1
800 TABLET, FILM COATED ORAL EVERY 8 HOURS PRN
Qty: 28 TABLET | Refills: 0 | Status: SHIPPED | OUTPATIENT
Start: 2019-10-23 | End: 2019-10-30

## 2019-10-23 ASSESSMENT — ENCOUNTER SYMPTOMS: BACK PAIN: 1

## 2019-10-23 NOTE — PATIENT INSTRUCTIONS
Patient Education     Costochondritis    Costochondritis is inflammation of a rib or the cartilage that connects a rib to your breastbone (sternum). It causes tenderness, and sometimes chest pain may be sharp or aching, or it may feel like pressure. Pain may get worse with deep breathing, movement, or exercise. In some cases, the pain is mistaken for a heart attack. Despite this, the condition is not serious. Read on to learn more about the condition and how it can be treated.  What causes costochondritis?  The cause of costochondritis is not completely clear, but it may happen after a chest injury, chest infection, or coughing episode. Some physical activities can sometimes lead to costochondritis. Large-breasted women may be more likely to have the condition. Often, the reason for the inflammation is unknown.  Diagnosing costochondritis  There is no test for costochondritis. The condition is diagnosed by the symptoms you have. Your healthcare provider will perform a physical exam. He or she will ask you about your symptoms and examine your chest for tenderness. In some cases, tests are done to rule out more serious problems. These tests may include imaging tests such as chest X-ray, CT scan, or an ECG.  Treating costochondritis  If an underlying cause is found, treatment for that will likely relieve the problem. Costochondritis often goes away on its own. The course of the condition varies from person to person. It usually lasts from weeks to months. In some cases, mild symptoms continue for months to years. To ease symptoms:    Take medicine as directed. These relieve pain and swelling. Ibuprofen or other NSAIDs are often recommended. In some cases, you may be given prescription medicine, such as muscle relaxants.    Avoid activities that put stress on the chest or spine.    Apply a heating pad (set to warm, not too high, heat) to the breastbone several times a day.    Perform stretching exercises as  directed.  Call the healthcare provider right away if you have any of the following:    Pain that is not relieved by medicine    Shortness of breath    Lightheadedness, dizziness, or fainting    Feeling of irregular heartbeat or fast pulse  Anyone with chest pain should see a healthcare provider, especially those who are older and may be at risk for heart disease.   Date Last Reviewed: 10/1/2016    1526-2498 [employer]. [address] [place] [address], [place] 1924267. All rights reserved. This information is not intended as a substitute for professional medical care. Always follow your healthcare professional's instructions.

## 2019-10-23 NOTE — PROGRESS NOTES
SUBJECTIVE:   Cyndy Alfred is a 44 year old female presenting with a chief complaint of   Chief Complaint   Patient presents with     Breathing Problem     Patient complains of pain in upper back when she inhales        She is an established patient of [facility].    Back Pain    Onset of symptoms was 1 day(s) ago.  Patient states started with upper left side with deep breath, and today right side.  Took 400 mg of ibuprofen earlier tat helped.    Location: right upper back  Radiation: does not radiate  Context:       The injury happened while at work      Mechanism: none recalled by the patient      Patient experienced gradual onset of pain  Course of symptoms is same.    Severity moderately severe  Current and Associated symptoms: pain  Denies: fecal incontinence, urinary incontinence, lower extremity numbness, lower extremity weakness and paresthesia    Aggravating Factors: coughing, sneezing, lifting, moving and deep breaths  Therapies to improve symptoms include: ibuprofen  Past history: no prior back problems      Review of Systems   Musculoskeletal: Positive for back pain.       Past Medical History:   Diagnosis Date     Headache(784.0)      History of cardiac murmur childhood     PIH (Pregnancy Induced Hypertension), Previous Postpartum Condition      Uterine fibroid      Family History   Problem Relation Age of Onset     EYE* Paternal Grandfather         WENT BLIND-? UNSURE WHY     Diabetes Father      Diabetes Mother      Hypertension Maternal Grandmother      Hypertension Maternal Grandfather      Current Outpatient Medications   Medication Sig Dispense Refill     ibuprofen (ADVIL/MOTRIN) 800 MG tablet Take 1 tablet (800 mg) by mouth every 8 hours as needed for moderate pain 28 tablet 0     acetaminophen (PAIN & FEVER) 325 MG tablet        Docusate Sodium (COLACE PO) Take 50 mg by mouth 2 times daily       Ferrous Sulfate (IRON SUPPLEMENT PO) Take 325 mg by mouth 2 times daily (with meals)       Ibuprofen  (MIDOL CRAMP FORMULA MAX ST PO) Take by mouth every 4 hours as needed for moderate pain       oxyCODONE-acetaminophen (PERCOCET) 5-325 MG per tablet        UNABLE TO FIND daily MEDICATION NAME: Green Tea Supplement       UNABLE TO FIND daily MEDICATION NAME: Tumeric supplement       VITAMIN D, CHOLECALCIFEROL, PO Take 2,000 Units by mouth daily       Social History     Tobacco Use     Smoking status: Never Smoker     Smokeless tobacco: Never Used   Substance Use Topics     Alcohol use: Yes     Comment: socially        OBJECTIVE  BP (!) 150/87 (BP Location: Left arm, Patient Position: Chair, Cuff Size: Adult Regular)   Pulse 74   Temp 98.4  F (36.9  C) (Oral)   Wt 70.9 kg (156 lb 3.2 oz)   SpO2 100%   BMI 27.24 kg/m      Physical Exam  Constitutional:       Appearance: Normal appearance.   Neck:      Musculoskeletal: Normal range of motion and neck supple.   Cardiovascular:      Rate and Rhythm: Normal rate and regular rhythm.      Pulses: Normal pulses.      Comments: Bilateral UE pulses equal  Pulmonary:      Effort: Pulmonary effort is normal.      Breath sounds: Normal breath sounds.   Musculoskeletal:        Arms:    Neurological:      Mental Status: She is alert.   Psychiatric:         Attention and Perception: Attention and perception normal.         Mood and Affect: Mood normal.         Speech: Speech normal.         Behavior: Behavior normal.         Labs:  No results found for this or any previous visit (from the past 24 hour(s)).    X-Ray was not done.    ASSESSMENT:      ICD-10-CM    1. Acute right-sided thoracic back pain M54.6 ibuprofen (ADVIL/MOTRIN) 800 MG tablet        Medical Decision Making:    Differential Diagnosis:      Serious Comorbid Conditions:  Adult:  None    PLAN:  BP recheck 150/87    Back Pain: ice, heat, stretching, Ibuprofen and Rx: ibuprofen 800 TID with food.    Followup:    If not improving or if condition worsens, follow up with your Primary Care Provider    Patient  Instructions       Patient Education     Costochondritis    Costochondritis is inflammation of a rib or the cartilage that connects a rib to your breastbone (sternum). It causes tenderness, and sometimes chest pain may be sharp or aching, or it may feel like pressure. Pain may get worse with deep breathing, movement, or exercise. In some cases, the pain is mistaken for a heart attack. Despite this, the condition is not serious. Read on to learn more about the condition and how it can be treated.  What causes costochondritis?  The cause of costochondritis is not completely clear, but it may happen after a chest injury, chest infection, or coughing episode. Some physical activities can sometimes lead to costochondritis. Large-breasted women may be more likely to have the condition. Often, the reason for the inflammation is unknown.  Diagnosing costochondritis  There is no test for costochondritis. The condition is diagnosed by the symptoms you have. Your healthcare provider will perform a physical exam. He or she will ask you about your symptoms and examine your chest for tenderness. In some cases, tests are done to rule out more serious problems. These tests may include imaging tests such as chest X-ray, CT scan, or an ECG.  Treating costochondritis  If an underlying cause is found, treatment for that will likely relieve the problem. Costochondritis often goes away on its own. The course of the condition varies from person to person. It usually lasts from weeks to months. In some cases, mild symptoms continue for months to years. To ease symptoms:    Take medicine as directed. These relieve pain and swelling. Ibuprofen or other NSAIDs are often recommended. In some cases, you may be given prescription medicine, such as muscle relaxants.    Avoid activities that put stress on the chest or spine.    Apply a heating pad (set to warm, not too high, heat) to the breastbone several times a day.    Perform stretching  exercises as directed.  Call the healthcare provider right away if you have any of the following:    Pain that is not relieved by medicine    Shortness of breath    Lightheadedness, dizziness, or fainting    Feeling of irregular heartbeat or fast pulse  Anyone with chest pain should see a healthcare provider, especially those who are older and may be at risk for heart disease.   Date Last Reviewed: 10/1/2016    [id number] [employer] [address] [place] [address], [place] [address]. All rights reserved. This information is not intended as a substitute for professional medical care. Always follow your healthcare professional's instructions.

## 2019-10-24 ENCOUNTER — TELEPHONE (OUTPATIENT)
Dept: OBGYN | Facility: CLINIC | Age: 44
End: 2019-10-24

## 2019-10-24 NOTE — TELEPHONE ENCOUNTER
SHE NEEDS TO GO TO ED NOW FOR PE.   CEPHAS AGBEH, MD.     RN called and spoke to patient. She will head to [facility] now.     Joan Butler RN on 10/24/2019 at 11:53 AM

## 2019-10-24 NOTE — TELEPHONE ENCOUNTER
Patient is calling OB/GYN specialty today stating that Dr. Agbeh is her primary provider and she is in need of his assistance.     Patient has a history of fibroids removed on 7/31/2019,  laproscoptic hysterectomy and was on medical leave for 6.5 weeks, no lifting since coming back to work in mid September. Patient mentions concerns about a possibility of pulmonary embolism. 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     Upper back pain got intense yesterday, but she has been having pain for about week making it difficult to breath. Patient states she went to  in [place] yesterday, saw an NP, that diagnosed with costochondritis, inflammation of the cartilage within the rib cage. Patient  Denies chest pain. Pain is mostly between the shoulder blades and does not radiate anywhere. Patient was prescibed Ibuprofen 800 mg to take every 8 hours as needed. Denies cough, fever, or any further cold symptoms.     Per patient report, when she inhales, she has pain in upper back, can't take a full deep breath without the pain. Started on left side yesterday and is now on the right.   One moment of dizziness yesterday. At Urgent Care her O2 sats were 100% and blood pressure was elevated due to pain.     Patient is requesting that a x-ray order be placed and further evaluation as she is worried about her symptoms.     RN advised patient that Dr. Agbeh is on call and she will route to him and family practice for assistance.     Call center told RN upon transfer that  had no openings for today.     RN to call patient back with recommendations by end of day.    Patient verbalized understanding and agreed to plan.     Joan Butler RN on 10/24/2019 at 11:37 AM

## 2019-11-04 ENCOUNTER — HEALTH MAINTENANCE LETTER (OUTPATIENT)
Age: 44
End: 2019-11-04

## 2020-09-26 NOTE — PATIENT INSTRUCTIONS
If you have any questions regarding your visit, Please contact your care team.    [facility] CLINIC HOURS TELEPHONE NUMBER   Sparkles Agbeh, M.D.    Mihaela Graham- LUCY Lucero - LUCY Temple -         Monday-[facility]  8:00 am - 5 pm  Tuesday- [facility]  8:00am- 5 pm  Wednesday- Off  Thursday- [facility]  8:00 am- 5 pm  Friday-[place]  8:00 am 5 pm [facility]  [address] [place] [address]  [phone] ask for Women[facility]    Imaging [phone]    [facility]  [address] [facility]  [place] [id number] [phone]  Imaging [phone]     Urgent Care locations:    [facility] Saturday and Sunday   9 am - 5 pm    Monday-Friday   12 pm - 8 pm  Saturday and Sunday   9 am - 5 pm   [phone]       If you need a medication refill, please contact your pharmacy. Please allow 3 business days for your refill to be completed.  As always, Thank you for trusting us with your healthcare needs!          
Not applicable as gestational age is greater than or equal to 34 weeks.

## 2020-10-27 ENCOUNTER — OFFICE VISIT (OUTPATIENT)
Dept: OBGYN | Facility: CLINIC | Age: 45
End: 2020-10-27
Payer: COMMERCIAL

## 2020-10-27 VITALS
DIASTOLIC BLOOD PRESSURE: 83 MMHG | HEART RATE: 76 BPM | BODY MASS INDEX: 27.29 KG/M2 | SYSTOLIC BLOOD PRESSURE: 121 MMHG | WEIGHT: 156.5 LBS

## 2020-10-27 DIAGNOSIS — Z01.411 ENCOUNTER FOR GYNECOLOGICAL EXAMINATION WITH ABNORMAL FINDING: Primary | ICD-10-CM

## 2020-10-27 LAB
ALBUMIN SERPL-MCNC: 3.9 G/DL (ref 3.4–5)
ALP SERPL-CCNC: 46 U/L (ref 40–150)
ALT SERPL W P-5'-P-CCNC: 28 U/L (ref 0–50)
ANION GAP SERPL CALCULATED.3IONS-SCNC: 7 MMOL/L (ref 3–14)
AST SERPL W P-5'-P-CCNC: 19 U/L (ref 0–45)
BILIRUB SERPL-MCNC: 0.4 MG/DL (ref 0.2–1.3)
BUN SERPL-MCNC: 10 MG/DL (ref 7–30)
CALCIUM SERPL-MCNC: 8.4 MG/DL (ref 8.5–10.1)
CHLORIDE SERPL-SCNC: 104 MMOL/L (ref 94–109)
CHOLEST SERPL-MCNC: 195 MG/DL
CO2 SERPL-SCNC: 28 MMOL/L (ref 20–32)
CREAT SERPL-MCNC: 0.81 MG/DL (ref 0.52–1.04)
ERYTHROCYTE [DISTWIDTH] IN BLOOD BY AUTOMATED COUNT: 14 % (ref 10–15)
GFR SERPL CREATININE-BSD FRML MDRD: 88 ML/MIN/{1.73_M2}
GLUCOSE SERPL-MCNC: 91 MG/DL (ref 70–99)
HBA1C MFR BLD: 5.4 % (ref 0–5.6)
HCT VFR BLD AUTO: 45.1 % (ref 35–47)
HDLC SERPL-MCNC: 77 MG/DL
HGB BLD-MCNC: 14.2 G/DL (ref 11.7–15.7)
LDLC SERPL CALC-MCNC: 106 MG/DL
MCH RBC QN AUTO: 26.7 PG (ref 26.5–33)
MCHC RBC AUTO-ENTMCNC: 31.5 G/DL (ref 31.5–36.5)
MCV RBC AUTO: 85 FL (ref 78–100)
NONHDLC SERPL-MCNC: 118 MG/DL
PLATELET # BLD AUTO: 221 10E9/L (ref 150–450)
POTASSIUM SERPL-SCNC: 3.5 MMOL/L (ref 3.4–5.3)
PROT SERPL-MCNC: 7.8 G/DL (ref 6.8–8.8)
RBC # BLD AUTO: 5.32 10E12/L (ref 3.8–5.2)
SODIUM SERPL-SCNC: 139 MMOL/L (ref 133–144)
TRIGL SERPL-MCNC: 61 MG/DL
TSH SERPL DL<=0.005 MIU/L-ACNC: 0.84 MU/L (ref 0.4–4)
WBC # BLD AUTO: 3.4 10E9/L (ref 4–11)

## 2020-10-27 PROCEDURE — 86481 TB AG RESPONSE T-CELL SUSP: CPT | Performed by: OBSTETRICS & GYNECOLOGY

## 2020-10-27 PROCEDURE — 80053 COMPREHEN METABOLIC PANEL: CPT | Performed by: OBSTETRICS & GYNECOLOGY

## 2020-10-27 PROCEDURE — 83036 HEMOGLOBIN GLYCOSYLATED A1C: CPT | Performed by: OBSTETRICS & GYNECOLOGY

## 2020-10-27 PROCEDURE — 85027 COMPLETE CBC AUTOMATED: CPT | Performed by: OBSTETRICS & GYNECOLOGY

## 2020-10-27 PROCEDURE — 36415 COLL VENOUS BLD VENIPUNCTURE: CPT | Performed by: OBSTETRICS & GYNECOLOGY

## 2020-10-27 PROCEDURE — 99396 PREV VISIT EST AGE 40-64: CPT | Performed by: OBSTETRICS & GYNECOLOGY

## 2020-10-27 PROCEDURE — 82306 VITAMIN D 25 HYDROXY: CPT | Performed by: OBSTETRICS & GYNECOLOGY

## 2020-10-27 PROCEDURE — 80061 LIPID PANEL: CPT | Performed by: OBSTETRICS & GYNECOLOGY

## 2020-10-27 PROCEDURE — 84443 ASSAY THYROID STIM HORMONE: CPT | Performed by: OBSTETRICS & GYNECOLOGY

## 2020-10-27 NOTE — PROGRESS NOTES
Cyndy is a 45 year old  here for annual exam.   She is doing well post Robotic assisted supracervical hysterectomy a year ago.  She is requesting labs icludingg quantiferon due to H/O BCG vaccination.  She wants papers filled out to exemt a flu shot due tp previous adverse reaction in  per patient.  ROS: Ten point review of systems was reviewed and negative except the above.    Health Maintenance   Topic Date Due     HIV SCREENING  1991     DTAP/TDAP/TD IMMUNIZATION (1 - Tdap) 2001     PREVENTIVE CARE VISIT  2016     PHQ-2  2020     INFLUENZA VACCINE (1) 2020     HPV TEST  2021     PAP  2021     Pneumococcal Vaccine: Pediatrics (0 to 5 Years) and At-Risk Patients (6 to 64 Years)  Aged Out     IPV IMMUNIZATION  Aged Out     MENINGITIS IMMUNIZATION  Aged Out     HEPATITIS B IMMUNIZATION  Aged Out      Last pap: 2018  Last Mammogram: none  Last Dexa: none  Last Colonoscopy: none  Lab Results   Component Value Date    CHOL 178 10/03/2017     Lab Results   Component Value Date    HDL 90 10/03/2017     Lab Results   Component Value Date    LDL 77 10/03/2017     Lab Results   Component Value Date    TRIG 54 10/03/2017     Lab Results   Component Value Date    CHOLHDLRATIO 2.5 2015         OBHX:    OB History    Para Term  AB Living   3 2 2 0 1 2   SAB TAB Ectopic Multiple Live Births   0 1 0 0 0      # Outcome Date GA Lbr Tay/2nd Weight Sex Delivery Anes PTL Lv   3 TAB            2 Term      CS-LTranv      1 Term      CS-LTranv        Past Surgical History:   Procedure Laterality Date     C/SECTION, LOW TRANSVERSE      x2     CYSTECTOMY OVARIAN BENIGN  2003     HYSTERECTOMY  2019       PMH: Her past medical, surgical, and obstetric histories were reviewed and are documented in their appropriate chart areas.    ALL/Meds: Her medication and allergy histories were reviewed and are documented in their appropriate chart areas.    SH/FMH: Her social  and family history was reviewed and documented in its appropriate chart area.    PE: /83   Pulse 76   Wt 71 kg (156 lb 8 oz)   LMP 07/14/2018   Breastfeeding No   BMI 27.29 kg/m    Body mass index is 27.29 kg/m .    General Appearance:  healthy, alert, active, no distress  Cardiovascular:  Regular rate and Rhythm  Neck: Supple, no adenopathy and thyroid normal  Lungs:  Clear, without wheeze, rale or rhonchi  Breast: normal breast exam  Abdomen: Benign, Soft, flat, non-tender, No masses, organomegaly, No inguinal nodes and Bowel sounds normoactiveSoft, nontender.   Pelvic:       - Ext: Vulva and perineum are normal without lesion, mass or discharge        - Urethra: normal without discharge or scarring or hypermobility       - Urethral Meatus: normal appearance,        - Bladder: no tenderness, no masses       - Vagina: Normal mucosa, no discharge     rugated       - Cervix: nulliparous            - Adnexa: Normal without masses or tenderness       - Rectal: deferred  Nurse for exam  A/P:  [facility],     ICD-10-CM    1. Encounter for gynecological examination with abnormal finding  Z01.411 CBC with platelets     Lipid panel reflex to direct LDL Fasting     TSH with free T4 reflex     Hemoglobin A1c     Comprehensive metabolic panel (BMP + Alb, Alk Phos, ALT, AST, Total. Bili, TP)     Vitamin D Deficiency     Quantiferon TB Gold Plus     *MA Screening Digital Bilateral      - Encouraged self-breast exam   - Encouraged low fat diet, regular exercise, and adequate calcium intake.    CEPHAS AGBEH, MD.

## 2020-10-27 NOTE — PATIENT INSTRUCTIONS
If you have any questions regarding your visit, Please contact your care team.  [facility]: [phone]  [facility] CLINIC HOURS TELEPHONE NUMBER   Cephas Agbeh, M.D.      Rufus Reddy-  Brielle-         Monday-[place]    8:00a.m-4:45 p.m    Tuesday--[place]     8:00a.m-4:45 p.m.    Thursday-[place]    8:00a.m-4:45 p.m.    Friday-[place]    8:00a.m-4:45 p.m    [facility]   [address] [place] [address]   [phone]-Ask for [facility]   Fax [phone]   [phone]     [facility]   [address] [place] [address] [place] [address]   [phone]    [facility]  [address] [address]  [phone]  [phone]   Urgent Care locations:    [facility] Monday-Friday  5 pm - 9 pm  Saturday and Sunday   9 am - 5 pm   Monday-Friday   5 pm - 9 pm  Saturday and Sunday  9 am - 5 pm    [phone]   If you need a medication refill, please contact your pharmacy. Please allow 3 business days for your refill to be completed.  As always, Thank you for trusting us with your healthcare needs!

## 2020-10-28 LAB — DEPRECATED CALCIDIOL+CALCIFEROL SERPL-MC: 63 UG/L (ref 20–75)

## 2020-10-29 LAB
GAMMA INTERFERON BACKGROUND BLD IA-ACNC: 0.27 IU/ML
M TB IFN-G CD4+ BCKGRND COR BLD-ACNC: 9.73 IU/ML
M TB TUBERC IFN-G BLD QL: NEGATIVE
MITOGEN IGNF BCKGRD COR BLD-ACNC: 0 IU/ML
MITOGEN IGNF BCKGRD COR BLD-ACNC: 0 IU/ML

## 2020-11-16 ENCOUNTER — HEALTH MAINTENANCE LETTER (OUTPATIENT)
Age: 45
End: 2020-11-16

## 2021-02-18 ENCOUNTER — TELEPHONE (OUTPATIENT)
Dept: OBGYN | Facility: CLINIC | Age: 46
End: 2021-02-18

## 2021-02-18 NOTE — TELEPHONE ENCOUNTER
Health Call Center    Phone Message    May a detailed message be left on voicemail: yes     Reason for Call:  The patient stated her job required a letter for her not to receive a flu shot.  Dr. Agbeh completed the form from her employer and now the employer is requiring details of possible reactions. The patient stated that her last flu shot was in 1995 and she became dizzy and short of breath.  She stated she just needs remarks added to the letter and it can be attached in [employer].     Action Taken: Message routed to:  [facility] p [phone]    Travel Screening: Not Applicable

## 2021-02-18 NOTE — LETTER
February 22, 2021      Cyndy Alfred  [address] [place] [address]      February 19, 2021        To Whom It May Concern:     Cyndy Alfred may not participate in flu vaccine due to patient reported side effects of flu vaccine . The patient stated that her last flu shot was in 1995 and she became dizzy and short of breath.     Please let us know if you have any further questions or concerns.    Thank you,  CEPHAS AGBEH, MD.   OB-Gyn Department  [facility]

## 2021-02-23 NOTE — TELEPHONE ENCOUNTER
Attempted to contact patient - no answer/VM full. See message below if she calls back.  Koki Kunz, CMA

## 2021-02-25 NOTE — TELEPHONE ENCOUNTER
Attempted to contact patient - same as below.    Sent letter via Intr[employer] message.  Koki Kunz, ALEXANDER

## 2021-04-04 ENCOUNTER — HEALTH MAINTENANCE LETTER (OUTPATIENT)
Age: 46
End: 2021-04-04

## 2021-09-18 ENCOUNTER — HEALTH MAINTENANCE LETTER (OUTPATIENT)
Age: 46
End: 2021-09-18

## 2022-01-08 ENCOUNTER — HEALTH MAINTENANCE LETTER (OUTPATIENT)
Age: 47
End: 2022-01-08

## 2022-04-30 ENCOUNTER — HEALTH MAINTENANCE LETTER (OUTPATIENT)
Age: 47
End: 2022-04-30

## 2022-11-20 ENCOUNTER — HEALTH MAINTENANCE LETTER (OUTPATIENT)
Age: 47
End: 2022-11-20

## 2023-01-12 ENCOUNTER — TELEPHONE (OUTPATIENT)
Dept: OBGYN | Facility: CLINIC | Age: 48
End: 2023-01-12

## 2023-01-12 NOTE — TELEPHONE ENCOUNTER
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 Patient states she needs to submit results by next week. Patient states she is at lab appointment now abd patient stated  states there are no lab orders for her. Writer explained patient needs to have a visit with a provider for lab orders to be place. Patient states she is okay with a provider visit, but why wasn't this relayed to her when scheduling appointment. Writer apologized for miscommunication and offered to schedule appointment with provider now. Patient agreed and okay to see any soonest available provider. Appointment scheduled for 1/13/23 at 9 am and advised patient to check in by 8:40 am. Patient verbalized understanding.     LUCY Lind  [facility]

## 2023-01-13 ENCOUNTER — VIRTUAL VISIT (OUTPATIENT)
Dept: FAMILY MEDICINE | Facility: CLINIC | Age: 48
End: 2023-01-13
Payer: COMMERCIAL

## 2023-01-13 ENCOUNTER — LAB (OUTPATIENT)
Dept: LAB | Facility: CLINIC | Age: 48
End: 2023-01-13
Payer: COMMERCIAL

## 2023-01-13 DIAGNOSIS — Z11.1 SCREENING EXAMINATION FOR PULMONARY TUBERCULOSIS: ICD-10-CM

## 2023-01-13 DIAGNOSIS — Z11.1 SCREENING EXAMINATION FOR PULMONARY TUBERCULOSIS: Primary | ICD-10-CM

## 2023-01-13 PROCEDURE — 99203 OFFICE O/P NEW LOW 30 MIN: CPT | Mod: 95 | Performed by: FAMILY MEDICINE

## 2023-01-13 PROCEDURE — 36415 COLL VENOUS BLD VENIPUNCTURE: CPT

## 2023-01-13 PROCEDURE — 86481 TB AG RESPONSE T-CELL SUSP: CPT

## 2023-01-13 NOTE — PROGRESS NOTES
Cyndy is a 47 year old who is being evaluated via a billable video visit.      How would you like to obtain your AVS? MyChart  If the video visit is dropped, the invitation should be resent by: Text to cell phone: [phone]  Will anyone else be joining your video visit? 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  We reviewed previous records and she last had this done in 2020.  Was negative.  No known exposures but she has been doing some traveling nursing.  Up-to-date on immunizations per patient though we do not have all of those records.  But the school has them and that is what matters.  - Quantiferon TB Gold Plus; Future    My first visit with patient previous history reviewed with her and in her chart     See Patient Instructions    Return in about 1 week (around 1/20/2023) for Based upon lab results.    Deidre Stephens MD  [facility]    Ernie Naylor is a 47 year old, presenting for the following health issues:  Labs Only      History of Present Illness       Reason for visit:  Need lab work done for school    She eats 2-3 servings of fruits and vegetables daily.She consumes 0 sweetened beverage(s) daily.She exercises with enough effort to increase her heart rate 30 to 60 minutes per day.  She exercises with enough effort to increase her heart rate 3 or less days per week.   She is taking medications regularly.       Video visit with patient today to set up lab work for TB testing.    Review of Systems   Constitutional, HEENT, cardiovascular, pulmonary, gi and gu systems are negative, except as otherwise noted.      Objective    Vitals - Patient Reported  Pain Score: No Pain (0)      Vitals:  No vitals were obtained today due to virtual visit.    Physical Exam   GENERAL: Healthy, alert and no distress  EYES: Eyes grossly normal to inspection.  No  discharge or erythema, or obvious scleral/conjunctival abnormalities.  RESP: No audible wheeze, cough, or visible cyanosis.  No visible retractions or increased work of breathing.    SKIN: Visible skin clear. No significant rash, abnormal pigmentation or lesions.  NEURO: Cranial nerves grossly intact.  Mentation and speech appropriate for age.  PSYCH: Mentation appears normal, affect normal/bright, judgement and insight intact, normal speech and appearance well-groomed.    Past labs reviewed with the patient.             Video-Visit Details    Type of service:  Video Visit     Originating Location (pt. Location): Home    Distant Location (provider location):  Off-site  Platform used for Video Visit: Casinity

## 2023-01-15 LAB
GAMMA INTERFERON BACKGROUND BLD IA-ACNC: 1.2 IU/ML
M TB IFN-G BLD-IMP: NEGATIVE
M TB IFN-G CD4+ BCKGRND COR BLD-ACNC: 8.8 IU/ML
MITOGEN IGNF BCKGRD COR BLD-ACNC: -0.29 IU/ML
MITOGEN IGNF BCKGRD COR BLD-ACNC: -0.3 IU/ML
QUANTIFERON MITOGEN: 10 IU/ML
QUANTIFERON NIL TUBE: 1.2 IU/ML
QUANTIFERON TB1 TUBE: 0.91 IU/ML
QUANTIFERON TB2 TUBE: 0.9

## 2023-01-15 NOTE — RESULT ENCOUNTER NOTE
Cyndy,  Here are the results of the TB test.  Negative as expected.  Let me know if you need some type of letter or verification or if these results will suffice?  JOHANNA Stephens M.D.

## 2023-04-15 ENCOUNTER — HEALTH MAINTENANCE LETTER (OUTPATIENT)
Age: 48
End: 2023-04-15

## 2023-06-01 ENCOUNTER — HEALTH MAINTENANCE LETTER (OUTPATIENT)
Age: 48
End: 2023-06-01

## 2024-01-11 ENCOUNTER — TELEPHONE (OUTPATIENT)
Dept: OBGYN | Facility: CLINIC | Age: 49
End: 2024-01-11
Payer: COMMERCIAL

## 2024-01-11 ENCOUNTER — LAB (OUTPATIENT)
Dept: LAB | Facility: CLINIC | Age: 49
End: 2024-01-11
Payer: COMMERCIAL

## 2024-01-11 ENCOUNTER — E-VISIT (OUTPATIENT)
Dept: FAMILY MEDICINE | Facility: CLINIC | Age: 49
End: 2024-01-11
Payer: COMMERCIAL

## 2024-01-11 DIAGNOSIS — Z11.1 SCREENING EXAMINATION FOR PULMONARY TUBERCULOSIS: Primary | ICD-10-CM

## 2024-01-11 DIAGNOSIS — Z11.1 SCREENING EXAMINATION FOR PULMONARY TUBERCULOSIS: ICD-10-CM

## 2024-01-11 PROCEDURE — 86481 TB AG RESPONSE T-CELL SUSP: CPT

## 2024-01-11 PROCEDURE — 36415 COLL VENOUS BLD VENIPUNCTURE: CPT

## 2024-01-11 PROCEDURE — 99421 OL DIG E/M SVC 5-10 MIN: CPT | Performed by: FAMILY MEDICINE

## 2024-01-11 NOTE — TELEPHONE ENCOUNTER
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 Please place this order so she can get it done by 1/13/24.      Action Taken: Message routed to:  Other: [facility]    Travel Screening: Not Applicable

## 2024-01-11 NOTE — TELEPHONE ENCOUNTER
Are these really going to Binh GARZA?   RN called and spoke with patient. Notified her that some type of visit would be needed for this. Advised patient to either schedule a virtual visit or initiate evisit to PCP. Patient verbalized understanding & preferred to initiate evisit to provider. RN sent Fatwirehart message to patient on how to initiate visit. Patient had no questions/concerns at this time.     KARLEY ColesN, RN   [facility]

## 2024-01-13 LAB
QUANTIFERON MITOGEN: 10 IU/ML
QUANTIFERON NIL TUBE: 0.84 IU/ML

## 2024-01-15 LAB
GAMMA INTERFERON BACKGROUND BLD IA-ACNC: 0.84 IU/ML
M TB IFN-G BLD-IMP: NEGATIVE
M TB IFN-G CD4+ BCKGRND COR BLD-ACNC: 9.16 IU/ML
MITOGEN IGNF BCKGRD COR BLD-ACNC: -0.32 IU/ML
MITOGEN IGNF BCKGRD COR BLD-ACNC: -0.39 IU/ML
QUANTIFERON TB1 TUBE: 0.45 IU/ML
QUANTIFERON TB2 TUBE: 0.52

## 2024-06-16 ENCOUNTER — HEALTH MAINTENANCE LETTER (OUTPATIENT)
Age: 49
End: 2024-06-16

## 2025-06-21 ENCOUNTER — HEALTH MAINTENANCE LETTER (OUTPATIENT)
Age: 50
End: 2025-06-21